# Patient Record
Sex: FEMALE | Race: WHITE | Employment: FULL TIME | URBAN - METROPOLITAN AREA
[De-identification: names, ages, dates, MRNs, and addresses within clinical notes are randomized per-mention and may not be internally consistent; named-entity substitution may affect disease eponyms.]

---

## 2023-09-14 SDOH — HEALTH STABILITY: PHYSICAL HEALTH: ON AVERAGE, HOW MANY DAYS PER WEEK DO YOU ENGAGE IN MODERATE TO STRENUOUS EXERCISE (LIKE A BRISK WALK)?: 6 DAYS

## 2023-09-14 SDOH — HEALTH STABILITY: PHYSICAL HEALTH: ON AVERAGE, HOW MANY MINUTES DO YOU ENGAGE IN EXERCISE AT THIS LEVEL?: 40 MIN

## 2023-09-15 ENCOUNTER — OFFICE VISIT (OUTPATIENT)
Dept: PRIMARY CARE CLINIC | Age: 38
End: 2023-09-15
Payer: COMMERCIAL

## 2023-09-15 VITALS
WEIGHT: 127 LBS | SYSTOLIC BLOOD PRESSURE: 112 MMHG | BODY MASS INDEX: 21.68 KG/M2 | HEIGHT: 64 IN | DIASTOLIC BLOOD PRESSURE: 70 MMHG

## 2023-09-15 DIAGNOSIS — J45.909 MODERATE ASTHMA WITHOUT COMPLICATION, UNSPECIFIED WHETHER PERSISTENT: ICD-10-CM

## 2023-09-15 DIAGNOSIS — F90.9 ATTENTION DEFICIT HYPERACTIVITY DISORDER (ADHD), UNSPECIFIED ADHD TYPE: ICD-10-CM

## 2023-09-15 DIAGNOSIS — K21.9 GASTROESOPHAGEAL REFLUX DISEASE WITHOUT ESOPHAGITIS: ICD-10-CM

## 2023-09-15 DIAGNOSIS — G89.29 CHRONIC BILATERAL LOW BACK PAIN WITH RIGHT-SIDED SCIATICA: ICD-10-CM

## 2023-09-15 DIAGNOSIS — R10.84 GENERALIZED ABDOMINAL PAIN: ICD-10-CM

## 2023-09-15 DIAGNOSIS — Z80.8 FAMILY HISTORY OF MELANOMA: ICD-10-CM

## 2023-09-15 DIAGNOSIS — Z00.00 ROUTINE ADULT HEALTH MAINTENANCE: Primary | ICD-10-CM

## 2023-09-15 DIAGNOSIS — Z87.19 HX OF UMBILICAL HERNIA REPAIR: ICD-10-CM

## 2023-09-15 DIAGNOSIS — K58.9 IRRITABLE BOWEL SYNDROME, UNSPECIFIED TYPE: ICD-10-CM

## 2023-09-15 DIAGNOSIS — N93.9 ABNORMAL VAGINAL BLEEDING: ICD-10-CM

## 2023-09-15 DIAGNOSIS — I47.1 SVT (SUPRAVENTRICULAR TACHYCARDIA) (HCC): ICD-10-CM

## 2023-09-15 DIAGNOSIS — M54.41 CHRONIC BILATERAL LOW BACK PAIN WITH RIGHT-SIDED SCIATICA: ICD-10-CM

## 2023-09-15 DIAGNOSIS — Z98.891 HISTORY OF CESAREAN SECTION: ICD-10-CM

## 2023-09-15 DIAGNOSIS — Z86.19 HISTORY OF SHINGLES: ICD-10-CM

## 2023-09-15 DIAGNOSIS — Z98.890 HX OF UMBILICAL HERNIA REPAIR: ICD-10-CM

## 2023-09-15 PROCEDURE — 99385 PREV VISIT NEW AGE 18-39: CPT | Performed by: STUDENT IN AN ORGANIZED HEALTH CARE EDUCATION/TRAINING PROGRAM

## 2023-09-15 RX ORDER — FLUTICASONE PROPIONATE AND SALMETEROL 500; 50 UG/1; UG/1
1 POWDER RESPIRATORY (INHALATION) 2 TIMES DAILY
Qty: 60 EACH | Refills: 5 | Status: SHIPPED | OUTPATIENT
Start: 2023-09-15

## 2023-09-15 RX ORDER — MELOXICAM 15 MG/1
15 TABLET ORAL DAILY
COMMUNITY
Start: 2023-07-15 | End: 2023-09-15 | Stop reason: SDUPTHER

## 2023-09-15 RX ORDER — ALBUTEROL SULFATE 90 UG/1
2 AEROSOL, METERED RESPIRATORY (INHALATION) EVERY 4 HOURS PRN
Qty: 18 G | Refills: 5 | Status: SHIPPED | OUTPATIENT
Start: 2023-09-15

## 2023-09-15 RX ORDER — PANTOPRAZOLE SODIUM 40 MG/1
40 TABLET, DELAYED RELEASE ORAL DAILY
COMMUNITY
Start: 2022-08-19

## 2023-09-15 RX ORDER — ALBUTEROL SULFATE 90 UG/1
2 AEROSOL, METERED RESPIRATORY (INHALATION) EVERY 4 HOURS PRN
COMMUNITY
Start: 2022-06-29 | End: 2023-09-15 | Stop reason: SDUPTHER

## 2023-09-15 RX ORDER — MELOXICAM 15 MG/1
15 TABLET ORAL DAILY
Qty: 30 TABLET | Refills: 0 | Status: SHIPPED | OUTPATIENT
Start: 2023-09-15

## 2023-09-15 RX ORDER — PREDNISONE 20 MG/1
40 TABLET ORAL DAILY
COMMUNITY
Start: 2022-12-22 | End: 2023-09-15

## 2023-09-15 RX ORDER — FLUTICASONE PROPIONATE AND SALMETEROL 500; 50 UG/1; UG/1
1 POWDER RESPIRATORY (INHALATION) 2 TIMES DAILY
COMMUNITY
Start: 2022-12-17 | End: 2023-09-15 | Stop reason: SDUPTHER

## 2023-09-15 RX ORDER — DEXMETHYLPHENIDATE HYDROCHLORIDE 5 MG/1
5 TABLET ORAL 2 TIMES DAILY
COMMUNITY
Start: 2023-05-10

## 2023-09-15 RX ORDER — BUDESONIDE 0.5 MG/2ML
0.5 INHALANT ORAL DAILY
COMMUNITY
Start: 2023-02-11

## 2023-09-15 RX ORDER — ALBUTEROL SULFATE 2.5 MG/3ML
2.5 SOLUTION RESPIRATORY (INHALATION) EVERY 6 HOURS PRN
COMMUNITY
Start: 2022-12-17

## 2023-09-15 SDOH — ECONOMIC STABILITY: INCOME INSECURITY: HOW HARD IS IT FOR YOU TO PAY FOR THE VERY BASICS LIKE FOOD, HOUSING, MEDICAL CARE, AND HEATING?: NOT HARD AT ALL

## 2023-09-15 SDOH — ECONOMIC STABILITY: FOOD INSECURITY: WITHIN THE PAST 12 MONTHS, YOU WORRIED THAT YOUR FOOD WOULD RUN OUT BEFORE YOU GOT MONEY TO BUY MORE.: NEVER TRUE

## 2023-09-15 SDOH — ECONOMIC STABILITY: HOUSING INSECURITY
IN THE LAST 12 MONTHS, WAS THERE A TIME WHEN YOU DID NOT HAVE A STEADY PLACE TO SLEEP OR SLEPT IN A SHELTER (INCLUDING NOW)?: NO

## 2023-09-15 SDOH — ECONOMIC STABILITY: FOOD INSECURITY: WITHIN THE PAST 12 MONTHS, THE FOOD YOU BOUGHT JUST DIDN'T LAST AND YOU DIDN'T HAVE MONEY TO GET MORE.: NEVER TRUE

## 2023-09-15 ASSESSMENT — PATIENT HEALTH QUESTIONNAIRE - PHQ9
2. FEELING DOWN, DEPRESSED OR HOPELESS: 0
1. LITTLE INTEREST OR PLEASURE IN DOING THINGS: 0
SUM OF ALL RESPONSES TO PHQ9 QUESTIONS 1 & 2: 0
SUM OF ALL RESPONSES TO PHQ QUESTIONS 1-9: 0

## 2023-09-15 ASSESSMENT — ENCOUNTER SYMPTOMS
BLOOD IN STOOL: 0
ABDOMINAL PAIN: 1
BACK PAIN: 1

## 2023-09-15 NOTE — ASSESSMENT & PLAN NOTE
Stable  - has been evaluated for this in the past by [facility] physicians - MRI order placed but patient never obtained and referred to PT  - reports she fell on a snow board when she was younger, has been having issues since then   Currently: has sciatica down right leg, continues to be able to walk with no major issues , no issues with defecating or urination   Plan: MRI placed

## 2023-09-15 NOTE — ASSESSMENT & PLAN NOTE
Stable  - diagnosed per laryngoscopy (ENT) due to having with trouble speaking   - had been seeing a speech pathologist in past   - currently stable   - medical management with protonix- continue

## 2023-09-15 NOTE — ASSESSMENT & PLAN NOTE
Stable- controlled with albuterol prn and advair BID - refilled today   - hx of exacerbation 2/2 to covid

## 2023-09-15 NOTE — ASSESSMENT & PLAN NOTE
- reporting heavy vaginal bleeding with clotting which is unusual for her   - hx of 2 c sections in 2016 and 2018   - is also due for a pap   Plan:   - referral placed to obgyn

## 2023-09-15 NOTE — PROGRESS NOTES
[id number] [facility] PRIMARY AND WALK-IN CARE  [facility]  [address] [place] [address] [place]  Dept: [phone]  Dept Fax: [phone]  Loc: [phone]     9/15/2023    Visit type: New patient    Reason for Visit: New Patient and H&P (Pt would like to discuss 2nd round of shingles, lower back pain, c/o RLQ pain and changes in cycles)       ASSESSMENT/PLAN   1. Routine adult health maintenance  -     CBC with Auto Differential; Future  -     Comprehensive Metabolic Panel; Future  -     Lipid, Fasting; Future  -     Hemoglobin A1C; Future  -     HIV Screen; Future  -     Hepatitis C Antibody; Future  -     Alonso Vela MD, OB-GYN, [place]  2. Chronic bilateral low back pain with right-sided sciatica  Assessment & Plan:   Stable  - has been evaluated for this in the past by [facility] physicians - MRI order placed but patient never obtained and referred to PT  - reports she fell on a snow board when she was younger, has been having issues since then   Currently: has sciatica down right leg, continues to be able to walk with no major issues , no issues with defecating or urination   Plan: MRI placed   Orders:  -     meloxicam (MOBIC) 15 MG tablet; Take 1 tablet by mouth daily, Disp-30 tablet, R-0Normal  -     MRI LUMBAR SPINE W WO CONTRAST; Future  3. Moderate asthma without complication, unspecified whether persistent  Assessment & Plan:   Stable- controlled with albuterol prn and advair BID - refilled today   - hx of exacerbation 2/2 to covid   - seen by pulmonology in the past at [facility]   Orders:  -     fluticasone-salmeterol (ADVAIR DISKUS) 500-50 MCG/ACT AEPB diskus inhaler; Inhale 1 puff into the lungs 2 times daily, Disp-60 each, R-5Normal  -     albuterol sulfate HFA (PROVENTIL;VENTOLIN;PROAIR) 108 (90 Base) MCG/ACT inhaler; Inhale 2 puffs into the lungs every 4 hours as needed for Shortness of Breath or Wheezing, Disp-18 g, R-5Normal  4.  Abnormal vaginal

## 2023-09-15 NOTE — ASSESSMENT & PLAN NOTE
Stable  - no rebound on exam   - hx of 2 c sections, hx of IBS, hx of umbilical hernia repair in December <1year ago   - pain located in right middle quadrant and only present with deep palpitation   - not associated with position or food or bowel movements   - has a gallbladder  - etiology unclear at this time, no concern for acute problems, will follow peripherally and if worsening consider CT

## 2023-09-15 NOTE — ASSESSMENT & PLAN NOTE
- seen by [facility] derm in 2022  - diagnosed with lentigines, multiple benign nevi and cherry angioma   Plan:   - was supposed to follow up with them but had insurance changes  - referral placed to derm

## 2023-09-15 NOTE — ASSESSMENT & PLAN NOTE
Resolved   - hx of shingles in college and in 2021 s/p valtrex   - will be due to shingles vaccine at age 49

## 2023-09-15 NOTE — ASSESSMENT & PLAN NOTE
Stable  - used to be on focalin, currently not taking   - at this time patient does not want to restart but would like to discuss other options in the future

## 2023-10-03 DIAGNOSIS — Z00.00 ROUTINE ADULT HEALTH MAINTENANCE: ICD-10-CM

## 2023-10-03 LAB
ALBUMIN SERPL-MCNC: 4.4 G/DL (ref 3.5–4.6)
ALP SERPL-CCNC: 37 U/L (ref 40–130)
ALT SERPL-CCNC: 8 U/L (ref 0–33)
ANION GAP SERPL CALCULATED.3IONS-SCNC: 12 MEQ/L (ref 9–15)
AST SERPL-CCNC: 23 U/L (ref 0–35)
BASOPHILS # BLD: 0 K/UL (ref 0–0.2)
BASOPHILS NFR BLD: 0.9 %
BILIRUB SERPL-MCNC: 0.3 MG/DL (ref 0.2–0.7)
BUN SERPL-MCNC: 10 MG/DL (ref 6–20)
CALCIUM SERPL-MCNC: 9 MG/DL (ref 8.5–9.9)
CHLORIDE SERPL-SCNC: 104 MEQ/L (ref 95–107)
CHOLEST SERPL-MCNC: 151 MG/DL (ref 0–199)
CO2 SERPL-SCNC: 25 MEQ/L (ref 20–31)
CREAT SERPL-MCNC: 0.71 MG/DL (ref 0.5–0.9)
EOSINOPHIL # BLD: 0.1 K/UL (ref 0–0.7)
EOSINOPHIL NFR BLD: 1.2 %
ERYTHROCYTE [DISTWIDTH] IN BLOOD BY AUTOMATED COUNT: 12.1 % (ref 11.5–14.5)
GLOBULIN SER CALC-MCNC: 2.9 G/DL (ref 2.3–3.5)
GLUCOSE SERPL-MCNC: 94 MG/DL (ref 70–99)
HBA1C MFR BLD: 4.8 % (ref 4.8–5.9)
HCT VFR BLD AUTO: 38.5 % (ref 37–47)
HDLC SERPL-MCNC: 72 MG/DL (ref 40–59)
HGB BLD-MCNC: 13 G/DL (ref 12–16)
LDL CHOLESTEROL CALCULATED: 65 MG/DL (ref 0–129)
LYMPHOCYTES # BLD: 2 K/UL (ref 1–4.8)
LYMPHOCYTES NFR BLD: 46.1 %
MCH RBC QN AUTO: 32.6 PG (ref 27–31.3)
MCHC RBC AUTO-ENTMCNC: 33.8 % (ref 33–37)
MCV RBC AUTO: 96.5 FL (ref 79.4–94.8)
MONOCYTES # BLD: 0.3 K/UL (ref 0.2–0.8)
MONOCYTES NFR BLD: 6.5 %
NEUTROPHILS # BLD: 2 K/UL (ref 1.4–6.5)
NEUTS SEG NFR BLD: 45.1 %
PLATELET # BLD AUTO: 244 K/UL (ref 130–400)
POTASSIUM SERPL-SCNC: 4.9 MEQ/L (ref 3.4–4.9)
PROT SERPL-MCNC: 7.3 G/DL (ref 6.3–8)
RBC # BLD AUTO: 3.99 M/UL (ref 4.2–5.4)
SODIUM SERPL-SCNC: 141 MEQ/L (ref 135–144)
TRIGLYCERIDE, FASTING: 71 MG/DL (ref 0–150)
WBC # BLD AUTO: 4.3 K/UL (ref 4.8–10.8)

## 2023-10-04 LAB
HEPATITIS C ANTIBODY: NONREACTIVE
HIV AG/AB: NONREACTIVE

## 2023-10-06 ENCOUNTER — TELEPHONE (OUTPATIENT)
Dept: OBSTETRICS AND GYNECOLOGY | Facility: CLINIC | Age: 38
End: 2023-10-06

## 2023-10-08 PROBLEM — N94.3 PMS (PREMENSTRUAL SYNDROME): Status: ACTIVE | Noted: 2023-10-08

## 2023-10-08 RX ORDER — FLUTICASONE PROPIONATE AND SALMETEROL 500; 50 UG/1; UG/1
1 POWDER RESPIRATORY (INHALATION) 2 TIMES DAILY
COMMUNITY
Start: 2023-09-15

## 2023-10-08 RX ORDER — MELOXICAM 15 MG/1
15 TABLET ORAL DAILY
COMMUNITY
Start: 2023-09-15

## 2023-10-08 RX ORDER — HYDROXYZINE HYDROCHLORIDE 25 MG/1
25 TABLET, FILM COATED ORAL DAILY PRN
COMMUNITY
Start: 2022-12-20 | End: 2023-11-14 | Stop reason: ALTCHOICE

## 2023-10-08 RX ORDER — ALBUTEROL SULFATE 90 UG/1
2 AEROSOL, METERED RESPIRATORY (INHALATION) EVERY 4 HOURS PRN
COMMUNITY
Start: 2023-09-15

## 2023-10-08 RX ORDER — PANTOPRAZOLE SODIUM 40 MG/1
40 TABLET, DELAYED RELEASE ORAL DAILY
COMMUNITY
Start: 2022-11-20

## 2023-10-08 RX ORDER — DROSPIRENONE AND ETHINYL ESTRADIOL 0.03MG-3MG
1 KIT ORAL DAILY
COMMUNITY
Start: 2023-09-20

## 2023-10-08 RX ORDER — DEXMETHYLPHENIDATE HYDROCHLORIDE 5 MG/1
5 TABLET ORAL 2 TIMES DAILY
COMMUNITY
Start: 2023-05-10 | End: 2024-05-16 | Stop reason: SDUPTHER

## 2023-10-08 RX ORDER — BUDESONIDE 0.5 MG/2ML
0.5 INHALANT ORAL
COMMUNITY
Start: 2022-12-17

## 2023-10-10 ENCOUNTER — TELEMEDICINE (OUTPATIENT)
Dept: GASTROENTEROLOGY | Facility: CLINIC | Age: 38
End: 2023-10-10
Payer: COMMERCIAL

## 2023-10-10 ENCOUNTER — LAB (OUTPATIENT)
Dept: LAB | Facility: LAB | Age: 38
End: 2023-10-10
Payer: COMMERCIAL

## 2023-10-10 DIAGNOSIS — R14.0 BLOATING: ICD-10-CM

## 2023-10-10 DIAGNOSIS — R10.12 LEFT UPPER QUADRANT ABDOMINAL PAIN: ICD-10-CM

## 2023-10-10 DIAGNOSIS — R14.0 BLOATING: Primary | ICD-10-CM

## 2023-10-10 PROBLEM — K58.9 IBS (IRRITABLE BOWEL SYNDROME): Status: ACTIVE | Noted: 2023-10-10

## 2023-10-10 PROCEDURE — 82607 VITAMIN B-12: CPT

## 2023-10-10 PROCEDURE — 86258 DGP ANTIBODY EACH IG CLASS: CPT

## 2023-10-10 PROCEDURE — 83550 IRON BINDING TEST: CPT

## 2023-10-10 PROCEDURE — 99203 OFFICE O/P NEW LOW 30 MIN: CPT | Performed by: NURSE PRACTITIONER

## 2023-10-10 PROCEDURE — 86364 TISS TRNSGLTMNASE EA IG CLAS: CPT

## 2023-10-10 PROCEDURE — 83540 ASSAY OF IRON: CPT

## 2023-10-10 PROCEDURE — 82746 ASSAY OF FOLIC ACID SERUM: CPT

## 2023-10-10 PROCEDURE — 82728 ASSAY OF FERRITIN: CPT

## 2023-10-10 PROCEDURE — 99213 OFFICE O/P EST LOW 20 MIN: CPT | Mod: 95 | Performed by: NURSE PRACTITIONER

## 2023-10-10 PROCEDURE — 82565 ASSAY OF CREATININE: CPT

## 2023-10-10 PROCEDURE — 36415 COLL VENOUS BLD VENIPUNCTURE: CPT

## 2023-10-10 PROCEDURE — 84520 ASSAY OF UREA NITROGEN: CPT

## 2023-10-10 RX ORDER — PEPPERMINT OIL 90 MG
CAPSULE, DELAYED, AND EXTENDED RELEASE ORAL
Qty: 30 CAPSULE | Refills: 3 | Status: SHIPPED | OUTPATIENT
Start: 2023-10-10 | End: 2023-11-14 | Stop reason: ALTCHOICE

## 2023-10-10 RX ORDER — WHEAT DEXTRIN 3 G/4 G
POWDER (GRAM) ORAL
Qty: 248 G | Refills: 1 | Status: SHIPPED | OUTPATIENT
Start: 2023-10-10 | End: 2023-11-14 | Stop reason: ALTCHOICE

## 2023-10-10 ASSESSMENT — ENCOUNTER SYMPTOMS
CARDIOVASCULAR NEGATIVE: 1
MUSCULOSKELETAL NEGATIVE: 1
RESPIRATORY NEGATIVE: 1
EYES NEGATIVE: 1
ENDOCRINE NEGATIVE: 1
CONSTITUTIONAL NEGATIVE: 1

## 2023-10-10 NOTE — PROGRESS NOTES
Subjective   Patient ID: Sonia Zaidi is a 38 y.o. female who presents for abdominal bloating.  She reports a long GI history. Has not been to GI in many years. Has a h/o constipation, bloating, gas. She has had abdominal xrays and colon cleanse.   She reports more recently she has a change. She is now having a constant bloat. She still has constipation. She is now having stomach cramping and over the last 2 months she is having abdominal pain, right side and right hip and left side. Described as burning. She had lab work.   She is not vegan.   The pain is not worse/better with a bowel movement but does improve somewhat  She is having a BM daily or 3-4 days and the pain is worse during those times. She is not using medications for constipation. She is not using anything daily.   No rectal bleeding, vomiting, +gas, nocturnal BM, dark tarry stools.   She denies weight loss or night sweats.   She had a colonoscopy in her 20s  Has fatty stools, rare--+SIBO in the past     Family Hx: P. Grandmother and grandfather with CRC (@ 74, 70s)  Father had glioblastoma (no genetic testing)   @ 72  No consanguinity  PSH: H/o umbilical hernia repair last year     Review of Systems   Constitutional: Negative.    HENT: Negative.     Eyes: Negative.    Respiratory: Negative.     Cardiovascular: Negative.    Endocrine: Negative.    Genitourinary: Negative.    Musculoskeletal: Negative.        Objective   Physical Exam  Neurological:      Mental Status: She is alert.         Assessment/Plan     Diagnoses and all orders for this visit:  Bloating  -     Ferritin; Future  -     wheat dextrin (Benefiber Sugar Free, dextrin,) 3 gram/4 gram powder; Take 1 teaspoon daily with a full glass of water (8 ounces)  -     Vitamin B12; Future  -     Folate; Future  -     Celiac Panel; Future  -     CT abdomen pelvis w IV contrast; Future  -     peppermint oiL (IBgard) 90 mg capsule,delayed,extend.release; Take as directed on box  -     Breath Hydrogen  Test-Dextrose; Future  Left upper quadrant abdominal pain  -     Ferritin; Future  -     wheat dextrin (Benefiber Sugar Free, dextrin,) 3 gram/4 gram powder; Take 1 teaspoon daily with a full glass of water (8 ounces)  -     Vitamin B12; Future  -     Folate; Future  -     Celiac Panel; Future  -     CT abdomen pelvis w IV contrast; Future  -     peppermint oiL (IBgard) 90 mg capsule,delayed,extend.release; Take as directed on box  -     Breath Hydrogen Test-Dextrose; Future

## 2023-10-11 LAB
BUN SERPL-MCNC: 12 MG/DL (ref 6–23)
CREAT SERPL-MCNC: 0.92 MG/DL (ref 0.5–1.05)
FERRITIN SERPL-MCNC: 17 NG/ML (ref 8–150)
FOLATE SERPL-MCNC: 14.3 NG/ML
GFR SERPL CREATININE-BSD FRML MDRD: 82 ML/MIN/1.73M*2
GLIADIN PEPTIDE IGA SER IA-ACNC: <1 U/ML
GLIADIN PEPTIDE IGG SER IA-ACNC: <1 U/ML
IRON SATN MFR SERPL: 20 %
IRON SERPL-MCNC: 83 UG/DL (ref 35–150)
TIBC SERPL-MCNC: 424 UG/DL
TTG IGA SER IA-ACNC: <1 U/ML
TTG IGG SER IA-ACNC: <1 U/ML
UIBC SERPL-MCNC: 341 UG/DL (ref 110–370)
VIT B12 SERPL-MCNC: 469 PG/ML (ref 211–911)

## 2023-10-12 ENCOUNTER — APPOINTMENT (OUTPATIENT)
Dept: GASTROENTEROLOGY | Facility: CLINIC | Age: 38
End: 2023-10-12
Payer: COMMERCIAL

## 2023-10-26 ENCOUNTER — ANCILLARY PROCEDURE (OUTPATIENT)
Dept: RADIOLOGY | Facility: CLINIC | Age: 38
End: 2023-10-26
Payer: COMMERCIAL

## 2023-10-26 DIAGNOSIS — R10.12 LEFT UPPER QUADRANT ABDOMINAL PAIN: ICD-10-CM

## 2023-10-26 DIAGNOSIS — R14.0 BLOATING: ICD-10-CM

## 2023-10-26 PROCEDURE — 74177 CT ABD & PELVIS W/CONTRAST: CPT

## 2023-10-26 PROCEDURE — 2550000001 HC RX 255 CONTRASTS: Performed by: NURSE PRACTITIONER

## 2023-10-26 PROCEDURE — 74177 CT ABD & PELVIS W/CONTRAST: CPT | Performed by: RADIOLOGY

## 2023-10-26 RX ADMIN — IOHEXOL 100 ML: 350 INJECTION, SOLUTION INTRAVENOUS at 09:33

## 2023-10-27 ENCOUNTER — TELEPHONE (OUTPATIENT)
Dept: GASTROENTEROLOGY | Facility: CLINIC | Age: 38
End: 2023-10-27
Payer: COMMERCIAL

## 2023-10-27 DIAGNOSIS — R93.5 ABNORMAL CT OF THE ABDOMEN: Primary | ICD-10-CM

## 2023-10-28 ENCOUNTER — LAB (OUTPATIENT)
Dept: LAB | Facility: LAB | Age: 38
End: 2023-10-28
Payer: COMMERCIAL

## 2023-10-28 DIAGNOSIS — R93.5 ABNORMAL CT OF THE ABDOMEN: ICD-10-CM

## 2023-10-28 PROCEDURE — 87328 CRYPTOSPORIDIUM AG IA: CPT

## 2023-10-28 PROCEDURE — 87493 C DIFF AMPLIFIED PROBE: CPT

## 2023-10-28 PROCEDURE — 87506 IADNA-DNA/RNA PROBE TQ 6-11: CPT

## 2023-10-28 PROCEDURE — 83993 ASSAY FOR CALPROTECTIN FECAL: CPT

## 2023-10-28 PROCEDURE — 87329 GIARDIA AG IA: CPT

## 2023-10-29 LAB
C COLI+JEJ+UPSA DNA STL QL NAA+PROBE: NOT DETECTED
C DIF TOX TCDA+TCDB STL QL NAA+PROBE: NOT DETECTED
EC STX1 GENE STL QL NAA+PROBE: NOT DETECTED
EC STX2 GENE STL QL NAA+PROBE: NOT DETECTED
NOROVIRUS GI + GII RNA STL NAA+PROBE: NOT DETECTED
RV RNA STL NAA+PROBE: NOT DETECTED
SALMONELLA DNA STL QL NAA+PROBE: NOT DETECTED
SHIGELLA DNA SPEC QL NAA+PROBE: NOT DETECTED
V CHOLERAE DNA STL QL NAA+PROBE: NOT DETECTED
Y ENTEROCOL DNA STL QL NAA+PROBE: NOT DETECTED

## 2023-10-30 DIAGNOSIS — K42.9 UMBILICAL HERNIA WITHOUT OBSTRUCTION AND WITHOUT GANGRENE: Primary | ICD-10-CM

## 2023-10-31 LAB — CALPROTECTIN STL-MCNT: 6 UG/G

## 2023-10-31 NOTE — TELEPHONE ENCOUNTER
Spoke to patient regarding CT results  Hernia is not palpable d/t diastasis recti   Soft and reducible  NO pain, did have umbilical hernia repair    She will follow up with GS and GYN

## 2023-11-01 LAB
CRYPTOSP AG STL QL IA: NEGATIVE
G LAMBLIA AG STL QL IA: NEGATIVE

## 2023-11-03 DIAGNOSIS — N93.9 ABNORMAL UTERINE BLEEDING (AUB): ICD-10-CM

## 2023-11-06 LAB — O+P STL MICRO: NEGATIVE

## 2023-11-09 ENCOUNTER — ANCILLARY PROCEDURE (OUTPATIENT)
Dept: RADIOLOGY | Facility: CLINIC | Age: 38
End: 2023-11-09
Payer: COMMERCIAL

## 2023-11-09 ENCOUNTER — APPOINTMENT (OUTPATIENT)
Dept: SURGERY | Facility: CLINIC | Age: 38
End: 2023-11-09
Payer: COMMERCIAL

## 2023-11-09 DIAGNOSIS — N93.9 ABNORMAL UTERINE BLEEDING (AUB): ICD-10-CM

## 2023-11-09 PROCEDURE — 76830 TRANSVAGINAL US NON-OB: CPT | Performed by: RADIOLOGY

## 2023-11-09 PROCEDURE — 76830 TRANSVAGINAL US NON-OB: CPT

## 2023-11-09 PROCEDURE — 76856 US EXAM PELVIC COMPLETE: CPT | Performed by: RADIOLOGY

## 2023-11-10 DIAGNOSIS — Z12.11 SCREENING FOR COLON CANCER: Primary | ICD-10-CM

## 2023-11-10 RX ORDER — POLYETHYLENE GLYCOL 3350, SODIUM SULFATE ANHYDROUS, SODIUM BICARBONATE, SODIUM CHLORIDE, POTASSIUM CHLORIDE 236; 22.74; 6.74; 5.86; 2.97 G/4L; G/4L; G/4L; G/4L; G/4L
4000 POWDER, FOR SOLUTION ORAL ONCE
Qty: 4000 ML | Refills: 0 | Status: SHIPPED | OUTPATIENT
Start: 2023-11-10 | End: 2023-11-10

## 2023-11-14 ENCOUNTER — OFFICE VISIT (OUTPATIENT)
Dept: SURGERY | Facility: CLINIC | Age: 38
End: 2023-11-14
Payer: COMMERCIAL

## 2023-11-14 ENCOUNTER — DOCUMENTATION (OUTPATIENT)
Dept: GASTROENTEROLOGY | Facility: CLINIC | Age: 38
End: 2023-11-14

## 2023-11-14 ENCOUNTER — PATIENT MESSAGE (OUTPATIENT)
Dept: GASTROENTEROLOGY | Facility: CLINIC | Age: 38
End: 2023-11-14

## 2023-11-14 VITALS — HEART RATE: 83 BPM | DIASTOLIC BLOOD PRESSURE: 85 MMHG | SYSTOLIC BLOOD PRESSURE: 125 MMHG | WEIGHT: 127 LBS

## 2023-11-14 DIAGNOSIS — R10.11 RIGHT UPPER QUADRANT ABDOMINAL PAIN: Primary | ICD-10-CM

## 2023-11-14 DIAGNOSIS — K63.8219 SMALL INTESTINAL BACTERIAL OVERGROWTH (SIBO): Primary | ICD-10-CM

## 2023-11-14 PROCEDURE — 99203 OFFICE O/P NEW LOW 30 MIN: CPT | Performed by: SURGERY

## 2023-11-14 NOTE — PROGRESS NOTES
History Of Present Illness  Sonia Zaidi is a 38 y.o. female presenting abdominal pain.  She has had intermittent abdominal pain interval symptoms for several years.  She is having a very thorough good work-up by her GI colleagues.  She had an umbilical hernia.  1 year ago at the [facility].  She does have some diastases rectus after 2 children.  She did have C-sections with his 2 children.  She has no family history of gallstones.  She does have some intermittent right upper quadrant pain.  Also right lower quadrant pain.  She is seeing me to assess on whether or not she truly has a hernia.  The CT scan made mention of that.  CT scan did make mention of a distended gallbladder.        Last Recorded Vitals  Blood pressure 125/85, pulse 83, weight 57.6 kg (127 lb).  Physical Examination  Family who are both standing up and lying down.  She has a curvilinear lateral umbilical incision.  She has some minor diastases rectus.  No umbilical hernia.      Relevant Results reviewed the CT scan with her.  Showing her the difference.  Diastases what would be a hernia.      Assessment/Plan no evidence of recurrent hernia.  I gave her my diastases rectus paperwork for exercises for diastases.  She has no limitations to her activities.  She can undergo her colonoscopy.  I ordered an ultrasound just to rule out gallstones.  I did show her the gallstone booklet.  This just to be complete I do not think she truly has gallstones but we will at least get the ultrasound to rule this out.  She will follow-up me as needed.    Ryan Huizar MD FACS  Professor of Surgery  Chanell Rothman Chair in Surgical [place]  [employer] [facility] [employer]  [address] [place] [address], [id number]  Phone [phone]  email: [email or web address]

## 2023-11-16 ENCOUNTER — APPOINTMENT (OUTPATIENT)
Dept: RADIOLOGY | Facility: CLINIC | Age: 38
End: 2023-11-16
Payer: COMMERCIAL

## 2023-11-16 ENCOUNTER — ANCILLARY PROCEDURE (OUTPATIENT)
Dept: RADIOLOGY | Facility: CLINIC | Age: 38
End: 2023-11-16
Payer: COMMERCIAL

## 2023-11-16 DIAGNOSIS — R10.11 RIGHT UPPER QUADRANT ABDOMINAL PAIN: ICD-10-CM

## 2023-11-16 PROCEDURE — 76705 ECHO EXAM OF ABDOMEN: CPT

## 2023-11-16 PROCEDURE — 76705 ECHO EXAM OF ABDOMEN: CPT | Performed by: STUDENT IN AN ORGANIZED HEALTH CARE EDUCATION/TRAINING PROGRAM

## 2023-11-20 ENCOUNTER — CLINICAL SUPPORT (OUTPATIENT)
Dept: GASTROENTEROLOGY | Facility: CLINIC | Age: 38
End: 2023-11-20
Payer: COMMERCIAL

## 2023-11-20 ENCOUNTER — TELEPHONE (OUTPATIENT)
Dept: SURGERY | Facility: HOSPITAL | Age: 38
End: 2023-11-20
Payer: COMMERCIAL

## 2023-11-20 VITALS
RESPIRATION RATE: 16 BRPM | DIASTOLIC BLOOD PRESSURE: 85 MMHG | HEART RATE: 80 BPM | BODY MASS INDEX: 22.36 KG/M2 | WEIGHT: 131 LBS | SYSTOLIC BLOOD PRESSURE: 124 MMHG | HEIGHT: 64 IN

## 2023-11-20 DIAGNOSIS — R14.0 BLOATING: ICD-10-CM

## 2023-11-20 DIAGNOSIS — K63.8219 SMALL INTESTINAL BACTERIAL OVERGROWTH (SIBO): Primary | ICD-10-CM

## 2023-11-20 PROCEDURE — 91065 BREATH HYDROGEN/METHANE TEST: CPT | Performed by: NURSE PRACTITIONER

## 2023-11-20 RX ORDER — NEOMYCIN SULFATE 500 MG/1
500 TABLET ORAL 2 TIMES DAILY
Qty: 28 TABLET | Refills: 0 | Status: SHIPPED | OUTPATIENT
Start: 2023-11-20 | End: 2023-11-27 | Stop reason: SDUPTHER

## 2023-11-20 RX ORDER — WHEAT DEXTRIN 3 G/4 G
POWDER (GRAM) ORAL
Qty: 248 G | Refills: 1 | Status: SHIPPED | OUTPATIENT
Start: 2023-11-20

## 2023-11-20 RX ORDER — POLYETHYLENE GLYCOL 3350 17 G/17G
17 POWDER, FOR SOLUTION ORAL DAILY PRN
Qty: 1 PACKET | Refills: 0 | Status: SHIPPED | OUTPATIENT
Start: 2023-11-20 | End: 2024-02-19 | Stop reason: WASHOUT

## 2023-11-20 NOTE — PROGRESS NOTES
Patient ID: Sonia Zaidi is a 38 y.o. female.    Breath Hydrogen Test-Dextrose    Date/Time: 11/20/2023 9:35 AM    Performed by: Eliza Zepeda  Authorized by: MICHAEL Maurer    Consent:     Consent obtained:  Verbal  Universal protocol:     Procedure explained and questions answered to patient or proxy's satisfaction: yes      Relevant documents present and verified: yes      Test results available: yes      Imaging studies available: no      Required blood products, implants, devices, and special equipment available: no      Site/side marked: yes      Immediately prior to procedure, a time out was called: yes      Patient identity confirmed:  Verbally with patient  Post-procedure details:     Procedure completion:  Tolerated well, no immediate complications  Hydrogen Breath Analysis Consultation Sheet    Referring Provider: No referring provider defined for this encounter.    Indication: Rule out SIBO    Symptoms: Bloating    Age: 38 y.o.  Weight:   Vitals:    11/20/23 0932   Weight: 59.4 kg (131 lb)     Substrate: Dextrose   Dose: 75 Grams    Last Meal: eggs, rice, chicken  Recent Antibiotics: Patient denies    RESULTS:   Time PPM (H2) APPM* (CH4) CO2 Correction   Baseline #1 0830 7 31 3.9 1.14   Baseline #2 0845 5 28 4.1 1.34   *Challenge Dose Sugar: 0845  15' 0900 4 24 4.1 1.34   30' 0915 3 24 4.3 1.27   45' 0930 4 27 3.8 1.44   60' 0945 3 24 3.7 1.48   75' 1000 3 29 4.0 1.37   90' 1015 2 27 3.6 1.52   105' 1030 1 30 3.9 1.41   120' 1045 1 29 4.2 1.41   135'        150'        165'        180'          Impression: Positive for Methane

## 2023-11-20 NOTE — TELEPHONE ENCOUNTER
Discussed with her that the ultrasound showed no evidence of any gallstones.  Presently there is no surgical indications needed for the hernia or for other abdominal problems.  She will follow-up me as needed.

## 2023-11-21 ENCOUNTER — TELEPHONE (OUTPATIENT)
Dept: GASTROENTEROLOGY | Facility: CLINIC | Age: 38
End: 2023-11-21
Payer: COMMERCIAL

## 2023-11-21 NOTE — TELEPHONE ENCOUNTER
Attempted to reschedule colonoscopy. VM left for pt to return call to office at earliest convenience.

## 2023-11-22 ENCOUNTER — TELEPHONE (OUTPATIENT)
Dept: GASTROENTEROLOGY | Facility: CLINIC | Age: 38
End: 2023-11-22
Payer: COMMERCIAL

## 2023-11-22 NOTE — TELEPHONE ENCOUNTER
Attempted to speak with patient in regards to rescheduling colonoscopy. VM left for patient to return call to office at earliest convenience.

## 2023-11-27 ENCOUNTER — SPECIALTY PHARMACY (OUTPATIENT)
Dept: PHARMACY | Facility: CLINIC | Age: 38
End: 2023-11-27

## 2023-11-27 DIAGNOSIS — K63.8219 SMALL INTESTINAL BACTERIAL OVERGROWTH (SIBO): ICD-10-CM

## 2023-11-27 RX ORDER — NEOMYCIN SULFATE 500 MG/1
500 TABLET ORAL 2 TIMES DAILY
Qty: 10 TABLET | Refills: 0 | Status: SHIPPED | OUTPATIENT
Start: 2023-11-27 | End: 2023-12-02

## 2023-11-29 ENCOUNTER — APPOINTMENT (OUTPATIENT)
Dept: GASTROENTEROLOGY | Facility: HOSPITAL | Age: 38
End: 2023-11-29
Payer: COMMERCIAL

## 2023-12-06 ENCOUNTER — ANESTHESIA EVENT (OUTPATIENT)
Dept: GASTROENTEROLOGY | Facility: EXTERNAL LOCATION | Age: 38
End: 2023-12-06

## 2023-12-06 RX ORDER — METRONIDAZOLE 500 MG/1
250 TABLET ORAL 3 TIMES DAILY
Qty: 15 TABLET | Refills: 0 | Status: SHIPPED | OUTPATIENT
Start: 2023-12-06 | End: 2023-12-16

## 2023-12-14 ENCOUNTER — HOSPITAL ENCOUNTER (OUTPATIENT)
Dept: GASTROENTEROLOGY | Facility: EXTERNAL LOCATION | Age: 38
Discharge: HOME | End: 2023-12-14
Payer: COMMERCIAL

## 2023-12-14 ENCOUNTER — ANESTHESIA (OUTPATIENT)
Dept: GASTROENTEROLOGY | Facility: EXTERNAL LOCATION | Age: 38
End: 2023-12-14

## 2023-12-14 VITALS
WEIGHT: 127 LBS | SYSTOLIC BLOOD PRESSURE: 90 MMHG | OXYGEN SATURATION: 99 % | DIASTOLIC BLOOD PRESSURE: 77 MMHG | HEART RATE: 92 BPM | BODY MASS INDEX: 21.68 KG/M2 | RESPIRATION RATE: 15 BRPM | HEIGHT: 64 IN | TEMPERATURE: 97.9 F

## 2023-12-14 DIAGNOSIS — R93.5 ABNORMAL CT OF THE ABDOMEN: Primary | ICD-10-CM

## 2023-12-14 PROBLEM — J45.909 MODERATE ASTHMA WITHOUT COMPLICATION (HHS-HCC): Status: ACTIVE | Noted: 2023-12-14

## 2023-12-14 LAB — PREGNANCY TEST URINE, POC: NEGATIVE

## 2023-12-14 PROCEDURE — 45385 COLONOSCOPY W/LESION REMOVAL: CPT | Performed by: INTERNAL MEDICINE

## 2023-12-14 PROCEDURE — 45381 COLONOSCOPY SUBMUCOUS NJX: CPT | Performed by: INTERNAL MEDICINE

## 2023-12-14 PROCEDURE — 88305 TISSUE EXAM BY PATHOLOGIST: CPT | Performed by: PATHOLOGY

## 2023-12-14 PROCEDURE — 88305 TISSUE EXAM BY PATHOLOGIST: CPT

## 2023-12-14 RX ORDER — LIDOCAINE HYDROCHLORIDE 20 MG/ML
INJECTION, SOLUTION INFILTRATION; PERINEURAL AS NEEDED
Status: DISCONTINUED | OUTPATIENT
Start: 2023-12-14 | End: 2023-12-14

## 2023-12-14 RX ORDER — SODIUM CHLORIDE 9 MG/ML
20 INJECTION, SOLUTION INTRAVENOUS CONTINUOUS
Status: DISCONTINUED | OUTPATIENT
Start: 2023-12-14 | End: 2023-12-15 | Stop reason: HOSPADM

## 2023-12-14 RX ORDER — PROPOFOL 10 MG/ML
INJECTION, EMULSION INTRAVENOUS AS NEEDED
Status: DISCONTINUED | OUTPATIENT
Start: 2023-12-14 | End: 2023-12-14

## 2023-12-14 RX ADMIN — PROPOFOL 50 MG: 10 INJECTION, EMULSION INTRAVENOUS at 10:55

## 2023-12-14 RX ADMIN — PROPOFOL 50 MG: 10 INJECTION, EMULSION INTRAVENOUS at 10:51

## 2023-12-14 RX ADMIN — PROPOFOL 50 MG: 10 INJECTION, EMULSION INTRAVENOUS at 11:10

## 2023-12-14 RX ADMIN — LIDOCAINE HYDROCHLORIDE 2 ML: 20 INJECTION, SOLUTION INFILTRATION; PERINEURAL at 10:48

## 2023-12-14 RX ADMIN — PROPOFOL 50 MG: 10 INJECTION, EMULSION INTRAVENOUS at 11:03

## 2023-12-14 RX ADMIN — PROPOFOL 50 MG: 10 INJECTION, EMULSION INTRAVENOUS at 10:53

## 2023-12-14 RX ADMIN — PROPOFOL 50 MG: 10 INJECTION, EMULSION INTRAVENOUS at 10:57

## 2023-12-14 RX ADMIN — PROPOFOL 50 MG: 10 INJECTION, EMULSION INTRAVENOUS at 11:15

## 2023-12-14 RX ADMIN — PROPOFOL 50 MG: 10 INJECTION, EMULSION INTRAVENOUS at 11:22

## 2023-12-14 RX ADMIN — PROPOFOL 100 MG: 10 INJECTION, EMULSION INTRAVENOUS at 10:48

## 2023-12-14 RX ADMIN — PROPOFOL 50 MG: 10 INJECTION, EMULSION INTRAVENOUS at 11:06

## 2023-12-14 RX ADMIN — PROPOFOL 50 MG: 10 INJECTION, EMULSION INTRAVENOUS at 11:29

## 2023-12-14 SDOH — HEALTH STABILITY: MENTAL HEALTH: CURRENT SMOKER: 0

## 2023-12-14 ASSESSMENT — PAIN SCALES - GENERAL
PAINLEVEL_OUTOF10: 0 - NO PAIN
PAINLEVEL_OUTOF10: 4
PAINLEVEL_OUTOF10: 0 - NO PAIN
PAIN_LEVEL: 0
PAINLEVEL_OUTOF10: 0 - NO PAIN

## 2023-12-14 ASSESSMENT — COLUMBIA-SUICIDE SEVERITY RATING SCALE - C-SSRS
2. HAVE YOU ACTUALLY HAD ANY THOUGHTS OF KILLING YOURSELF?: NO
1. IN THE PAST MONTH, HAVE YOU WISHED YOU WERE DEAD OR WISHED YOU COULD GO TO SLEEP AND NOT WAKE UP?: NO
6. HAVE YOU EVER DONE ANYTHING, STARTED TO DO ANYTHING, OR PREPARED TO DO ANYTHING TO END YOUR LIFE?: NO

## 2023-12-14 ASSESSMENT — PAIN - FUNCTIONAL ASSESSMENT
PAIN_FUNCTIONAL_ASSESSMENT: 0-10

## 2023-12-14 NOTE — DISCHARGE INSTRUCTIONS
.Patient Instructions Post Procedure      The anesthetics, sedatives or narcotics which were given to you today will be acting in your body for the next 24 hours, so you might feel a little sleepy or groggy.  This feeling should slowly wear off. Carefully read and follow the instructions.     You received sedation today:  - Do not drive or operate any machinery or power tools of any kind.   - No alcoholic beverages today, not even beer or wine.  - Do not make any important decisions or sign any legal documents.  - No over the counter medications that contain alcohol or that may cause drowsiness.    While it is common to experience mild to moderate abdominal distention, gas, or belching after your procedure, if any of these symptoms occur following discharge from the GI Lab or within one week of having your procedure, call the [facility] to be advised whether a visit to your nearest Urgent Care or Emergency Department is indicated.  Take this paper with you if you go.   - If you develop an allergic reaction to the medications that were given during your procedure such as difficulty breathing, rash, hives, severe nausea, vomiting or lightheadedness.  - If you experience chest pain, shortness of breath, severe abdominal pain, fevers and chills.  -If you develop signs and symptoms of bleeding such as blood in your spit, if your stools turn black, tarry, or bloody  - If you have not urinated within 8 hours following your procedure.  - If your IV site becomes painful, red, inflamed, or looks infected.    If you received a biopsy/polypectomy/sphincterotomy the following instructions apply below:  __ Do not use Aspirin containing products, non-steroidal medications or anti-coagulants for one week following your procedure. (Examples of these types of medications are: Advil, Arthrotec, Aleve, Coumadin, Ecotrin, Heparin, Ibuprofen, Indocin, Motrin, Naprosyn, Nuprin, Plavix, Vioxx, and Voltarin, or their generic  forms.  This list is not all-inclusive.  Check with your physician or pharmacist before resuming medications.)   __ Eat a soft diet today.  Avoid foods that are poorly digested for the next 24 hours.  These foods would include: nuts, beans, lettuce, red meats, and fried foods. Start with liquids and advance your diet as tolerated, gradually work up to eating solids.   __ Do not have a Barium Study or Enema for one week.    Your physician recommends the additional following instructions:    -You have a contact number available for emergencies. The signs and symptoms of potential delayed complications were discussed with you. You may return to normal activities tomorrow.  -Resume your previous diet or other if specified.  -Continue your present medications.   -We are waiting for your pathology results, if applicable.  -The findings and recommendations have been discussed with you and/or family.  - Please see Medication Reconciliation Form for new medication/medications prescribed.     If you experience any problems or have any questions following discharge from the GI Lab, please call: [phone] from 7 am- 4:30 pm.  In the event of an emergency please go to the closest Emergency Department or call Dr. Lopez @ [phone]

## 2023-12-14 NOTE — PRE-SEDATION DOCUMENTATION
Patient: Sonia Zaidi  MRN: [id number]    Pre-sedation Evaluation:  Sedation necessary for: Analgesia  Requesting service: gi    History of Present Illness: abnormal imaging     Past Medical History:   Diagnosis Date    Anxiety     Asthma     IBS (irritable bowel syndrome)        Principle problems:  Patient Active Problem List    Diagnosis Date Noted    Moderate asthma without complication 2023    IBS (irritable bowel syndrome) 10/10/2023    PMS (premenstrual syndrome) 10/08/2023     Allergies:  No Known Allergies  PTA/Current Medications:  (Not in a hospital admission)    Current Outpatient Medications   Medication Sig Dispense Refill    albuterol 90 mcg/actuation inhaler Inhale 2 puffs every 4 hours if needed for shortness of breath or wheezing. Shortness of breath or wheezing      budesonide (Pulmicort) 0.5 mg/2 mL nebulizer solution Take 2 mL (0.5 mg) by nebulization once daily.      meloxicam (Mobic) 15 mg tablet Take 1 tablet (15 mg) by mouth once daily.      polyethylene glycol (Miralax) 17 gram packet Take 17 g by mouth once daily as needed (1-2times/day as needed for constipation). 1 packet 0    Wixela Inhub 500-50 mcg/dose diskus inhaler Inhale 1 puff 2 times a day.      dexmethylphenidate (Focalin) 5 mg tablet Take 1 tablet (5 mg) by mouth 2 times a day.      drospirenone-ethinyl estradioL (Mahi, Ocella) 3-0.03 mg tablet Take 1 tablet by mouth once daily. as directed      metroNIDAZOLE (Flagyl) 500 mg tablet Take 0.5 tablets (250 mg) by mouth 3 times a day for 10 days. 15 tablet 0    pantoprazole (ProtoNix) 40 mg EC tablet Take 1 tablet (40 mg) by mouth once daily.      wheat dextrin (Benefiber Sugar Free, dextrin,) 3 gram/4 gram powder Take 1 teaspoon daily with a full glass of water (8 ounces) (Patient not taking: Reported on 2023) 248 g 1     No current facility-administered medications for this encounter.     Past Surgical History:   has a past surgical history that includes   section, classic and Umbilical hernia repair.    Recent sedation/surgery (24 hours) No    Review of Systems:  Please check all that apply: No significant medical history    Pregnancy test completed prior to procedure on any menstruating female: negative        NPO guidelines met: Yes    Physical Exam / Plan

## 2023-12-14 NOTE — PRE-SEDATION DOCUMENTATION
Patient: Sonia Zaidi  MRN: [id number]    Pre-sedation Evaluation:  Sedation necessary for: Analgesia  Requesting service: gi    History of Present Illness: abd pain, family hx colon cancer,abnormal imaging     Past Medical History:   Diagnosis Date    Anxiety     Asthma     IBS (irritable bowel syndrome)        Principle problems:  Patient Active Problem List    Diagnosis Date Noted    Moderate asthma without complication 12/14/2023    IBS (irritable bowel syndrome) 10/10/2023    PMS (premenstrual syndrome) 10/08/2023     Allergies:  No Known Allergies  PTA/Current Medications:  (Not in a hospital admission)    Current Outpatient Medications   Medication Sig Dispense Refill    albuterol 90 mcg/actuation inhaler Inhale 2 puffs every 4 hours if needed for shortness of breath or wheezing. Shortness of breath or wheezing      budesonide (Pulmicort) 0.5 mg/2 mL nebulizer solution Take 2 mL (0.5 mg) by nebulization once daily.      meloxicam (Mobic) 15 mg tablet Take 1 tablet (15 mg) by mouth once daily.      polyethylene glycol (Miralax) 17 gram packet Take 17 g by mouth once daily as needed (1-2times/day as needed for constipation). 1 packet 0    Wixela Inhub 500-50 mcg/dose diskus inhaler Inhale 1 puff 2 times a day.      dexmethylphenidate (Focalin) 5 mg tablet Take 1 tablet (5 mg) by mouth 2 times a day.      drospirenone-ethinyl estradioL (Mahi, Ocella) 3-0.03 mg tablet Take 1 tablet by mouth once daily. as directed      metroNIDAZOLE (Flagyl) 500 mg tablet Take 0.5 tablets (250 mg) by mouth 3 times a day for 10 days. 15 tablet 0    pantoprazole (ProtoNix) 40 mg EC tablet Take 1 tablet (40 mg) by mouth once daily.      wheat dextrin (Benefiber Sugar Free, dextrin,) 3 gram/4 gram powder Take 1 teaspoon daily with a full glass of water (8 ounces) (Patient not taking: Reported on 12/14/2023) 248 g 1     No current facility-administered medications for this encounter.     Past Surgical History:   has a past surgical  history that includes  section, classic and Umbilical hernia repair.    Recent sedation/surgery (24 hours) No    Review of Systems:  Please check all that apply: No significant medical history    Pregnancy test completed prior to procedure on any menstruating female: negative        NPO guidelines met: Yes    Physical Exam    Airway  Mallampati: I  TM distance: <3 FB  Neck ROM: full     Cardiovascular - normal exam     Dental - normal exam     Pulmonary - normal exam         Plan    ASA 1     Deep

## 2023-12-14 NOTE — ANESTHESIA POSTPROCEDURE EVALUATION
Patient: Sonia Zaidi    Procedure Summary       Date: 12/14/23 Room / Location: [facility]    Anesthesia Start: 1044 Anesthesia Stop:     Procedure: COLONOSCOPY Diagnosis:       Abnormal CT of the abdomen      Abnormal CT of the abdomen    Scheduled Providers: Elmer Lopez MD; Blanca Rocha RN Responsible Provider: GARETH Crouch    Anesthesia Type: MAC ASA Status: 2            Anesthesia Type: MAC    Vitals Value Taken Time   BP 87/46 12/14/23 1139   Temp 36.5 12/14/23 1139   Pulse 65 12/14/23 1139   Resp 14 12/14/23 1139   SpO2 99 12/14/23 1139       Anesthesia Post Evaluation    Patient location during evaluation: bedside  Patient participation: complete - patient participated  Level of consciousness: sleepy but conscious  Pain score: 0  Pain management: adequate  Airway patency: patent  Cardiovascular status: acceptable  Respiratory status: acceptable  Hydration status: acceptable  Postoperative Nausea and Vomiting: none        There were no known notable events for this encounter.

## 2023-12-15 NOTE — ASSESSMENT & PLAN NOTE
Stable  - continues to feel symptoms here and there   - has been evaluated by [facility] cardiology in 2021- assessed as benign s/p normal EKG and structurally normal heart)   - no medication management needed at this time, will continue with vagal maneuvers prn Subjective  Chief Complaint   Patient presents with    Anxiety     HPI:  Kristina Peterson is a 26 y.o. female. Patient presents to discuss anxiety and panic attacks for the past 3 weeks. Reports history of anxiety since teens. Has been mild enough to manage without medication. Anxiety is causing difficulty concentrating at work. Panic attacks have happened in the past but rarely. Panic attacks are now occurring every 1-2 days. Symptoms include trembling, crying, chest tightness. Panic attacks improve by taking a shower or listening to music. Panic attacks typically occur upon awakening in the morning without warning. Employed as . Denies panic attacks at work. Unable to identify trigger for increasing anxiety and panic attacks. Trying to eat less sugar and increasing exercise. Has not taken medication in the past. Strong family history of anxiety in parents and brother. Parents take benzos as needed. Sertraline did not help fatherRaysaer was better. Does not know what brother takes. Has never seen a counselor.          Past Medical History:   Diagnosis Date    Urinary reflux     as a baby     Family History   Problem Relation Age of Onset    No Known Problems Father     No Known Problems Mother      Social History     Socioeconomic History    Marital status: Single     Spouse name: Not on file    Number of children: Not on file    Years of education: Not on file    Highest education level: Not on file   Occupational History    Not on file   Tobacco Use    Smoking status: Never    Smokeless tobacco: Never   Vaping Use    Vaping Use: Never used   Substance and Sexual Activity    Alcohol use: Not Currently    Drug use: Not Currently    Sexual activity: Not on file   Other Topics Concern    Not on file   Social History Narrative    Not on file     Social Determinants of Health     Financial Resource Strain: Low Risk  (9/11/2023)    Overall Financial Resource Strain (CARDIA)     Difficulty of Paying Living

## 2023-12-20 ENCOUNTER — TELEMEDICINE (OUTPATIENT)
Dept: OBSTETRICS AND GYNECOLOGY | Facility: CLINIC | Age: 38
End: 2023-12-20
Payer: COMMERCIAL

## 2023-12-20 DIAGNOSIS — N94.3 PMS (PREMENSTRUAL SYNDROME): Primary | ICD-10-CM

## 2023-12-20 PROCEDURE — 99213 OFFICE O/P EST LOW 20 MIN: CPT | Performed by: ADVANCED PRACTICE MIDWIFE

## 2023-12-20 NOTE — PROGRESS NOTES
"Virtual or Telephone Consent    An interactive audio and video telecommunication system which permits real time communications between the patient (at the originating site) and provider (at the distant site) was utilized to provide this telehealth service.   Verbal consent was requested and obtained from Sonia Zaidi on this date, 12/20/23 for a telehealth visit.      Assessment/Plan   Diagnoses and all orders for this visit:  PMS (premenstrual syndrome)  Continue supportive therapies  Treat GI bacterial overgrowth  \"Come as you are\" book for libido recommended  RTC annual exam    STORM Rhodes    Subjective   Sonia Zaidi is a 38 y.o. female who presents for BC FOLLOW UP     Patient given MAHI stated she has not been taking it makes her \"crazy\"    STORM Rhodes    HPI    Pt presents to follow up PMS symptoms and pelvic ultrasound  Mahi did not agree with her  Was diagnosed with a bacterial overgrowth in gut which she plans to treat, hoping this will help  Signed up for acupuncture  Working out 4-5x weekly  Read books on nutrition and hormones  Thinks taking holistic route is the way she will continue    Menstrual History:  OB History    No obstetric history on file.           Objective   There were no vitals taken for this visit.  Ultrasound reviewed    Physical Exam  Virtual visit  Does not appear in acute distress    "

## 2023-12-22 LAB
LABORATORY COMMENT REPORT: NORMAL
PATH REPORT.FINAL DX SPEC: NORMAL
PATH REPORT.GROSS SPEC: NORMAL
PATH REPORT.TOTAL CANCER: NORMAL

## 2023-12-28 ENCOUNTER — TELEPHONE (OUTPATIENT)
Dept: GASTROENTEROLOGY | Facility: EXTERNAL LOCATION | Age: 38
End: 2023-12-28
Payer: COMMERCIAL

## 2023-12-29 ENCOUNTER — ANCILLARY PROCEDURE (OUTPATIENT)
Dept: RADIOLOGY | Facility: CLINIC | Age: 38
End: 2023-12-29
Payer: COMMERCIAL

## 2023-12-29 DIAGNOSIS — G89.29 OTHER CHRONIC PAIN: ICD-10-CM

## 2023-12-29 DIAGNOSIS — M54.41 LUMBAGO WITH SCIATICA, RIGHT SIDE: ICD-10-CM

## 2023-12-29 PROCEDURE — 72148 MRI LUMBAR SPINE W/O DYE: CPT

## 2023-12-29 PROCEDURE — 72148 MRI LUMBAR SPINE W/O DYE: CPT | Performed by: STUDENT IN AN ORGANIZED HEALTH CARE EDUCATION/TRAINING PROGRAM

## 2024-02-12 DIAGNOSIS — R14.0 BLOATING: ICD-10-CM

## 2024-02-12 DIAGNOSIS — K58.9 IRRITABLE BOWEL SYNDROME, UNSPECIFIED TYPE: Primary | ICD-10-CM

## 2024-02-19 ENCOUNTER — OFFICE VISIT (OUTPATIENT)
Dept: PRIMARY CARE | Facility: CLINIC | Age: 39
End: 2024-02-19
Payer: COMMERCIAL

## 2024-02-19 VITALS
WEIGHT: 123.9 LBS | SYSTOLIC BLOOD PRESSURE: 108 MMHG | DIASTOLIC BLOOD PRESSURE: 70 MMHG | HEIGHT: 66 IN | BODY MASS INDEX: 19.91 KG/M2 | TEMPERATURE: 97.7 F

## 2024-02-19 DIAGNOSIS — R91.1 PULMONARY NODULE: ICD-10-CM

## 2024-02-19 DIAGNOSIS — M54.41 CHRONIC BILATERAL LOW BACK PAIN WITH RIGHT-SIDED SCIATICA: ICD-10-CM

## 2024-02-19 DIAGNOSIS — G89.29 CHRONIC BILATERAL LOW BACK PAIN WITH RIGHT-SIDED SCIATICA: ICD-10-CM

## 2024-02-19 DIAGNOSIS — F90.9 ATTENTION DEFICIT HYPERACTIVITY DISORDER (ADHD), UNSPECIFIED ADHD TYPE: Primary | ICD-10-CM

## 2024-02-19 PROCEDURE — 1036F TOBACCO NON-USER: CPT | Performed by: FAMILY MEDICINE

## 2024-02-19 PROCEDURE — 99203 OFFICE O/P NEW LOW 30 MIN: CPT | Performed by: FAMILY MEDICINE

## 2024-02-19 RX ORDER — METRONIDAZOLE 500 MG/1
500 TABLET ORAL 3 TIMES DAILY
COMMUNITY

## 2024-02-19 ASSESSMENT — PATIENT HEALTH QUESTIONNAIRE - PHQ9
2. FEELING DOWN, DEPRESSED OR HOPELESS: NOT AT ALL
SUM OF ALL RESPONSES TO PHQ9 QUESTIONS 1 AND 2: 0
1. LITTLE INTEREST OR PLEASURE IN DOING THINGS: NOT AT ALL

## 2024-02-19 NOTE — PROGRESS NOTES
"Subjective   Patient ID: Sonia Zaidi is a 38 y.o. female who presents for [facility].    Pt presents to \[facility]\"" care:       P Med Hx: asthma  ADHD- on and off meds  SVT  She is following with GI, chronic constipation x 10 years     Low Back Pain  MRI 12/23  Radiates down her right leg at times   She was referred to PT, had not yet gone      P Surg Hx: reviewed in EMR  C sections x 2      HM: up to date on PAP  UTD on dental and vision exams         Review of Systems    Objective   /70 (BP Location: Right arm, Patient Position: Sitting)   Temp 36.5 °C (97.7 °F)   Ht 1.676 m (5' 6\")   Wt 56.2 kg (123 lb 14.4 oz)   BMI 20.00 kg/m²     Physical Exam  Vitals and nursing note reviewed.   Constitutional:       Appearance: Normal appearance.   Cardiovascular:      Rate and Rhythm: Normal rate and regular rhythm.      Heart sounds: Normal heart sounds.   Pulmonary:      Effort: Pulmonary effort is normal.      Breath sounds: Normal breath sounds.   Abdominal:      General: Bowel sounds are normal.      Palpations: Abdomen is soft.   Musculoskeletal:      Lumbar back: Spasms and tenderness present.   Skin:     General: Skin is warm and dry.   Neurological:      General: No focal deficit present.      Mental Status: She is alert and oriented to person, place, and time.      Cranial Nerves: No cranial nerve deficit.      Sensory: No sensory deficit.      Motor: No weakness.      Coordination: Coordination normal.      Gait: Gait normal.      Deep Tendon Reflexes: Reflexes normal.   Psychiatric:         Mood and Affect: Mood normal.         Behavior: Behavior normal.         Thought Content: Thought content normal.         Judgment: Judgment normal.       Pt presents to \[facility]\"" care, P Med Hx, P Surg Hx, Family Hx, Meds and Allergies all reviewed     Assessment/Plan   Diagnoses and all orders for this visit:  Attention deficit hyperactivity disorder (ADHD), unspecified ADHD type  -     Referral to Neurology; " Future  Chronic bilateral low back pain with right-sided sciatica  -     Referral to Physical Therapy; Future  Pulmonary nodule    Repeat CT scan one year 10/24    Charlene Espinoza,

## 2024-02-21 DIAGNOSIS — R14.0 BLOATING: ICD-10-CM

## 2024-02-21 DIAGNOSIS — K58.9 IRRITABLE BOWEL SYNDROME, UNSPECIFIED TYPE: ICD-10-CM

## 2024-04-10 ENCOUNTER — APPOINTMENT (OUTPATIENT)
Dept: DERMATOLOGY | Facility: CLINIC | Age: 39
End: 2024-04-10
Payer: COMMERCIAL

## 2024-05-16 ENCOUNTER — OFFICE VISIT (OUTPATIENT)
Dept: NEUROLOGY | Facility: CLINIC | Age: 39
End: 2024-05-16
Payer: COMMERCIAL

## 2024-05-16 VITALS
BODY MASS INDEX: 22.67 KG/M2 | HEART RATE: 75 BPM | HEIGHT: 64 IN | WEIGHT: 132.8 LBS | SYSTOLIC BLOOD PRESSURE: 131 MMHG | DIASTOLIC BLOOD PRESSURE: 90 MMHG

## 2024-05-16 DIAGNOSIS — G93.40 ENCEPHALOPATHY: Primary | ICD-10-CM

## 2024-05-16 DIAGNOSIS — F90.2 ATTENTION DEFICIT HYPERACTIVITY DISORDER (ADHD), COMBINED TYPE: ICD-10-CM

## 2024-05-16 PROCEDURE — 1036F TOBACCO NON-USER: CPT | Performed by: PSYCHIATRY & NEUROLOGY

## 2024-05-16 PROCEDURE — 80360 METHYLPHENIDATE: CPT

## 2024-05-16 PROCEDURE — 99205 OFFICE O/P NEW HI 60 MIN: CPT | Performed by: PSYCHIATRY & NEUROLOGY

## 2024-05-16 RX ORDER — DEXMETHYLPHENIDATE HYDROCHLORIDE 5 MG/1
5 TABLET ORAL 2 TIMES DAILY
Qty: 60 TABLET | Refills: 0 | Status: SHIPPED | OUTPATIENT
Start: 2024-05-16 | End: 2024-06-15

## 2024-05-16 RX ORDER — DEXMETHYLPHENIDATE HYDROCHLORIDE 5 MG/1
5 TABLET ORAL 2 TIMES DAILY
Qty: 60 TABLET | Refills: 0 | Status: SHIPPED | OUTPATIENT
Start: 2024-07-15 | End: 2024-08-14

## 2024-05-16 RX ORDER — DEXMETHYLPHENIDATE HYDROCHLORIDE 5 MG/1
5 TABLET ORAL 2 TIMES DAILY
Qty: 60 TABLET | Refills: 0 | Status: SHIPPED | OUTPATIENT
Start: 2024-06-15

## 2024-05-16 ASSESSMENT — PATIENT HEALTH QUESTIONNAIRE - PHQ9
1. LITTLE INTEREST OR PLEASURE IN DOING THINGS: NOT AT ALL
2. FEELING DOWN, DEPRESSED OR HOPELESS: NOT AT ALL
SUM OF ALL RESPONSES TO PHQ9 QUESTIONS 1 & 2: 0
2. FEELING DOWN, DEPRESSED OR HOPELESS: NOT AT ALL
1. LITTLE INTEREST OR PLEASURE IN DOING THINGS: NOT AT ALL
SUM OF ALL RESPONSES TO PHQ9 QUESTIONS 1 AND 2: 0

## 2024-05-16 ASSESSMENT — ENCOUNTER SYMPTOMS
NERVOUS/ANXIOUS: 1
DECREASED CONCENTRATION: 1
PALPITATIONS: 1

## 2024-05-16 NOTE — PROGRESS NOTES
Sonia Austinjuan r  39 y.o.       SUBJECTIVE    ADHD       Sonia is a 39-year-old young lady who was seen today for evaluation of her possible attention deficit disorder with difficulty at work at home.  She was diagnosed with ADHD when she was in college and had an extensive workup done and has been tried on Adderall which gave her side effects Ritalin which did not seem to work that well for her and then Focalin which seems to be working very well for her.  Right now she is working full-time and takes Focalin 5 mg half a tablet to full tablet a day as needed for work.  Today her physical and neurological examination is normal.  Based on the diagnostic checklist history in the past to determine.  She has combined type of ADHD and I would like to continue Focalin 5 mg twice a day the way she is taking and depending on how she does might make future recommendation when she comes back to see me in 3 to 4 months    I have discussed the controlled substance policy, abuse of potential, risk benefit and the precautions to be taken which she understood.    As you recall, Sonia is a 39-year-old young lady who has been having difficulty since early childhood but according to her she did have problem in elementary middle school and high school where she was having hard time paying attention focusing and getting her work done.  She was diagnosed with ADHD in college.  Today on the diagnostic checklist she had 6 out of 9 for inattentive and 4 out of 9 for Hyper-Tet type of ADHD    Her birth and developmental history's were unremarkable and she did graduate from college and currently works for [employer]    She lives with her  and has 2 daughters out of which 1 daughter has ADHD and currently on Metadate.    Her past medical history is remarkable for SVT and had a complete cardiac workup which according to her was reportedly normal and was advised to do Valsalva maneuver whenever she is having tachycardia    No  "history of any smoking alcohol and substance abuse.      Due to technical limitations of voice recognition and human error, this note may not accurately reflect the care of the patient.    Review of Systems   Cardiovascular:  Positive for palpitations.   Psychiatric/Behavioral:  Positive for decreased concentration. The patient is nervous/anxious.         Patient Active Problem List   Diagnosis    PMS (premenstrual syndrome)    IBS (irritable bowel syndrome)    Moderate asthma without complication ([facility]-HCC)     Past Medical History:   Diagnosis Date    Anxiety     Asthma ([facility]-HCC)     IBS (irritable bowel syndrome)      Past Surgical History:   Procedure Laterality Date     SECTION, CLASSIC      UMBILICAL HERNIA REPAIR         reports that she has never smoked. She has never used smokeless tobacco. She reports current alcohol use. She reports that she does not use drugs.    /90   Pulse 75   Ht 1.626 m (5' 4\")   Wt 60.2 kg (132 lb 12.8 oz)   BMI 22.80 kg/m²     OBJECTIVE  Physical Exam/Neurological Exam   Constitutional: General appearance: no acute distress   Auscultation of Heart: Regular rate and rhythm, no murmurs, normal S1 and S2.   Carotid Arteries: Intact without any bruits.   Neck is supple.   No lymph adenopathy.   Peripheral Vascular Exam: Pulses +2 and equal in all extremities. No swelling, varicosities, edema or tenderness to palpations.    Abdomen is soft, nondistended. No organomegaly.  Mental status: The patient was in no distress, alert, interactive and cooperative. Affect is appropriate.   Orientation: oriented to person, oriented to place and oriented to time.   Memory: recent memory intact and remote memory intact.   Attention: normal attention span and normal concentrating ability.   Language: normal comprehension and no difficulty naming common objects.   Fund of knowledge: Patient displays adequate knowledge of current events, adequate fund of knowledge regarding past history " and adequate fund of knowledge regarding vocabulary.   Eyes: The ophthalmoscopic examination was normal. The fundi are visualized with normal disc margins and without.  Cranial nerve II: Visual fields full to confrontation.   Cranial nerves III, IV, and VI: Pupils round, equally reactive to light; no ptosis. EOMs intact. No nystagmus.   Cranial Nerve V: Facial sensation intact bilaterally.   Cranial nerve VII: Normal and symmetric facial strength.   Cranial nerve VIII: Hearing is intact bilaterally to finger rub / whisper.   Cranial nerves IX and X: Palate elevates symmetrically.   Cranial nerve XI: Shoulder shrug and neck rotation strength are intact.   Cranial nerve XII: Tongue midline with normal strength.   Motor: Motor exam was normal. Muscle bulk was normal in both upper and lower extremities. Muscle tone was normal in both upper and lower extremities. Muscle strength was 5/5 throughout. no abnormal or adventitious movements were present.   Deep Tendon Reflexes: left biceps 2+ , right biceps 2+, left triceps 2+, right triceps 2+, left brachioradialis 2+, right brachioradialis 2+, left patella 2+, right patella 2+, left ankle jerk 2+, right ankle jerk 2+   Plantar Reflex: Toes downgoing to plantar stimulation on the left. Toes downgoing to plantar stimulation on the right.   Sensory Exam: Normal to light touch.   Coordination: There is no limb dystaxia and rapid alternating movements are intact.  Gait: Gait is normal without spasticity, ataxia or bradykinesia. Stance is stable with a negative Romberg.      ASSESSMENT/PLAN  Diagnoses and all orders for this visit:  Encephalopathy  Attention deficit hyperactivity disorder (ADHD), combined type  -     dexmethylphenidate (Focalin) 5 mg tablet; Take 1 tablet (5 mg) by mouth 2 times a day.  -     dexmethylphenidate (Focalin) 5 mg tablet; Take 1 tablet (5 mg) by mouth 2 times a day. Do not fill before July 15, 2024.  -     dexmethylphenidate (Focalin) 5 mg tablet;  Take 1 tablet (5 mg) by mouth 2 times a day. Do not fill before Lisa 15, 2024.        Nikolas Pereira MD  5/16/2024  11:23 AM

## 2024-05-21 LAB
ME-PHENIDATE UR-MCNC: <10 NG/ML
PPAA UR-MCNC: <50 NG/ML

## 2024-06-05 ENCOUNTER — APPOINTMENT (OUTPATIENT)
Dept: NEUROLOGY | Facility: CLINIC | Age: 39
End: 2024-06-05
Payer: COMMERCIAL

## 2024-06-12 ENCOUNTER — OFFICE VISIT (OUTPATIENT)
Dept: OBSTETRICS AND GYNECOLOGY | Facility: CLINIC | Age: 39
End: 2024-06-12
Payer: COMMERCIAL

## 2024-06-12 VITALS
HEIGHT: 64 IN | DIASTOLIC BLOOD PRESSURE: 80 MMHG | SYSTOLIC BLOOD PRESSURE: 110 MMHG | OXYGEN SATURATION: 99 % | BODY MASS INDEX: 22.07 KG/M2 | WEIGHT: 129.25 LBS | HEART RATE: 85 BPM

## 2024-06-12 DIAGNOSIS — N64.4 MASTODYNIA: Primary | ICD-10-CM

## 2024-06-12 PROCEDURE — 99213 OFFICE O/P EST LOW 20 MIN: CPT | Performed by: ADVANCED PRACTICE MIDWIFE

## 2024-06-12 NOTE — PROGRESS NOTES
"Assessment/Plan   Diagnoses and all orders for this visit:  Mastodynia  -     BI mammo bilateral diagnostic tomosynthesis; Future  -     BI US breast limited left; Future  -     CBC; Future    CBC to rule out infectious process as L breast pain near axilla  Follow up per results  If pains persistent refer for consult with breast specialist given degree of discomfort to patient    Subjective   Sonia Zaidi is a 39 y.o. female who presents for breast pain, both side.  L>R, wakes in night, persists beyond cycle.   Has had bilateral breast pain with cycles for some time but this is different.    Menstrual History:  OB History    No obstetric history on file.        Patient's last menstrual period was 05/22/2024.         ROS as in HPI    Objective   /80   Pulse 85   Ht 1.626 m (5' 4\")   Wt 58.6 kg (129 lb 4 oz)   LMP 05/22/2024   SpO2 99%   BMI 22.19 kg/m²     Physical Exam  Constitutional:       General: She is not in acute distress.  Pulmonary:      Effort: Pulmonary effort is normal.   Chest:      Chest wall: Tenderness present. No mass, lacerations, deformity or swelling.   Breasts:     Right: Tenderness (generalized) present. No swelling, bleeding, inverted nipple, mass, nipple discharge or skin change.      Left: Tenderness (upper outer quadrant greater than rest of breast) present. No swelling, bleeding, inverted nipple, mass, nipple discharge or skin change.   Skin:     General: Skin is warm and dry.      Findings: No lesion.   Neurological:      Mental Status: She is alert.           "

## 2024-08-22 PROBLEM — R10.84 GENERALIZED ABDOMINAL PAIN: Status: ACTIVE | Noted: 2024-08-22

## 2024-08-22 PROBLEM — Z86.59 HISTORY OF ADHD: Status: ACTIVE | Noted: 2022-11-21

## 2024-08-22 PROBLEM — I47.10 SVT (SUPRAVENTRICULAR TACHYCARDIA) (CMS-HCC): Status: ACTIVE | Noted: 2022-11-21

## 2024-08-22 PROBLEM — Z98.891 HISTORY OF CESAREAN SECTION: Status: ACTIVE | Noted: 2024-08-22

## 2024-08-22 PROBLEM — G89.29 CHRONIC BILATERAL LOW BACK PAIN WITH RIGHT-SIDED SCIATICA: Status: ACTIVE | Noted: 2023-09-15

## 2024-08-22 PROBLEM — N93.9 ABNORMAL VAGINAL BLEEDING: Status: ACTIVE | Noted: 2023-09-15

## 2024-08-22 PROBLEM — K21.9 GASTROESOPHAGEAL REFLUX DISEASE WITHOUT ESOPHAGITIS: Status: ACTIVE | Noted: 2022-11-21

## 2024-08-22 PROBLEM — M54.41 CHRONIC BILATERAL LOW BACK PAIN WITH RIGHT-SIDED SCIATICA: Status: ACTIVE | Noted: 2023-09-15

## 2024-08-22 PROBLEM — F90.9 ADHD: Status: ACTIVE | Noted: 2024-08-22

## 2024-08-22 PROBLEM — F41.9 ANXIETY: Status: ACTIVE | Noted: 2018-10-05

## 2024-08-22 PROBLEM — Z86.19 HISTORY OF SHINGLES: Status: ACTIVE | Noted: 2024-08-22

## 2024-08-27 ENCOUNTER — APPOINTMENT (OUTPATIENT)
Dept: NEUROLOGY | Facility: CLINIC | Age: 39
End: 2024-08-27
Payer: COMMERCIAL

## 2024-08-27 VITALS
WEIGHT: 131 LBS | DIASTOLIC BLOOD PRESSURE: 76 MMHG | HEART RATE: 94 BPM | HEIGHT: 64 IN | SYSTOLIC BLOOD PRESSURE: 108 MMHG | BODY MASS INDEX: 22.36 KG/M2

## 2024-08-27 DIAGNOSIS — F41.9 ANXIETY: ICD-10-CM

## 2024-08-27 DIAGNOSIS — M54.41 CHRONIC BILATERAL LOW BACK PAIN WITH RIGHT-SIDED SCIATICA: ICD-10-CM

## 2024-08-27 DIAGNOSIS — F90.0 ATTENTION DEFICIT HYPERACTIVITY DISORDER (ADHD), PREDOMINANTLY INATTENTIVE TYPE: ICD-10-CM

## 2024-08-27 DIAGNOSIS — G93.40 ENCEPHALOPATHY: Primary | ICD-10-CM

## 2024-08-27 DIAGNOSIS — G89.29 CHRONIC BILATERAL LOW BACK PAIN WITH RIGHT-SIDED SCIATICA: ICD-10-CM

## 2024-08-27 DIAGNOSIS — I47.10 SVT (SUPRAVENTRICULAR TACHYCARDIA) (CMS-HCC): ICD-10-CM

## 2024-08-27 PROCEDURE — 99214 OFFICE O/P EST MOD 30 MIN: CPT | Performed by: PSYCHIATRY & NEUROLOGY

## 2024-08-27 PROCEDURE — 3008F BODY MASS INDEX DOCD: CPT | Performed by: PSYCHIATRY & NEUROLOGY

## 2024-08-27 PROCEDURE — 1036F TOBACCO NON-USER: CPT | Performed by: PSYCHIATRY & NEUROLOGY

## 2024-08-27 RX ORDER — SERDEXMETHYLPHENIDATE AND DEXMETHYLPHENIDATE 5.2; 26.1 MG/1; MG/1
1 CAPSULE ORAL DAILY
Qty: 30 CAPSULE | Refills: 0 | Status: SHIPPED | OUTPATIENT
Start: 2024-09-26

## 2024-08-27 RX ORDER — SERDEXMETHYLPHENIDATE AND DEXMETHYLPHENIDATE 5.2; 26.1 MG/1; MG/1
1 CAPSULE ORAL DAILY
Qty: 30 CAPSULE | Refills: 0 | Status: SHIPPED | OUTPATIENT
Start: 2024-10-25

## 2024-08-27 RX ORDER — SERDEXMETHYLPHENIDATE AND DEXMETHYLPHENIDATE 5.2; 26.1 MG/1; MG/1
1 CAPSULE ORAL DAILY
Qty: 30 CAPSULE | Refills: 0 | Status: SHIPPED | OUTPATIENT
Start: 2024-08-27

## 2024-08-27 ASSESSMENT — ENCOUNTER SYMPTOMS
TROUBLE SWALLOWING: 0
NUMBNESS: 0
HEADACHES: 0
LIGHT-HEADEDNESS: 0
NAUSEA: 0
FREQUENCY: 0
ABDOMINAL PAIN: 0
DIFFICULTY URINATING: 0
SHORTNESS OF BREATH: 0
COUGH: 0
HYPERACTIVE: 0
PHOTOPHOBIA: 0
ARTHRALGIAS: 0
WEAKNESS: 0
AGITATION: 0
EYE PAIN: 0
JOINT SWELLING: 0
NECK PAIN: 0
HALLUCINATIONS: 0
DECREASED CONCENTRATION: 1
UNEXPECTED WEIGHT CHANGE: 0
ADENOPATHY: 0
TREMORS: 0
WHEEZING: 0
SPEECH DIFFICULTY: 0
FATIGUE: 0
SLEEP DISTURBANCE: 1
DIZZINESS: 0
FEVER: 0
SEIZURES: 0
PALPITATIONS: 1
CONFUSION: 0
BACK PAIN: 0
BRUISES/BLEEDS EASILY: 0
NECK STIFFNESS: 0
SINUS PRESSURE: 0
NERVOUS/ANXIOUS: 1
FACIAL ASYMMETRY: 0
VOMITING: 0

## 2024-08-27 ASSESSMENT — PATIENT HEALTH QUESTIONNAIRE - PHQ9
1. LITTLE INTEREST OR PLEASURE IN DOING THINGS: NOT AT ALL
SUM OF ALL RESPONSES TO PHQ9 QUESTIONS 1 & 2: 0
2. FEELING DOWN, DEPRESSED OR HOPELESS: NOT AT ALL

## 2024-08-27 NOTE — PROGRESS NOTES
Sonia Zaidi  39 y.o.       SUBJECTIVE    Encephalopathy  Presenting symptoms: no confusion    Associated symptoms: palpitations    Associated symptoms: no abdominal pain, no agitation, no fever, no hallucinations, no headaches, no light-headedness, no nausea, no rash, no seizures, no vomiting and no weakness       Sonia is a 39-year-old young lady who was seen today for follow-up of her encephalopathy due to attention deficit disorder with difficulty with work and home.  Since last seen she is doing better on the medicine but she does not like the way she feels when she is coming of the medicine.  She has been tried on Focalin and Adderall and few of the medication in the past and had similar effect.  Today her physical and neurological examination was normal.  She has a history of supraventricular tachycardia.  I would like to change her medicine to Azstarys 26.1 mg and advised her to discuss that with her cardiologist and if she is having any cardiac symptoms may stop the medication.  Have discussed the controlled substance policy, abuse potential risk and benefit and if she is still having issues may switch her to Focalin XR at a later date.  I have scheduled to come back and see me in 2 to 3 months    I did review the medication list.  Due to technical limitations of voice recognition and human error, this note may not accurately reflect the care of the patient.    Review of Systems   Constitutional:  Negative for fatigue, fever and unexpected weight change.   HENT:  Negative for dental problem, ear pain, hearing loss, sinus pressure, tinnitus and trouble swallowing.    Eyes:  Negative for photophobia, pain and visual disturbance.   Respiratory:  Negative for cough, shortness of breath and wheezing.    Cardiovascular:  Positive for palpitations. Negative for chest pain and leg swelling.   Gastrointestinal:  Negative for abdominal pain, nausea and vomiting.   Genitourinary:  Negative for difficulty urinating,  "enuresis and frequency.   Musculoskeletal:  Negative for arthralgias, back pain, joint swelling, neck pain and neck stiffness.   Skin:  Negative for pallor and rash.   Allergic/Immunologic: Negative for food allergies.   Neurological:  Negative for dizziness, tremors, seizures, syncope, facial asymmetry, speech difficulty, weakness, light-headedness, numbness and headaches.   Hematological:  Negative for adenopathy. Does not bruise/bleed easily.   Psychiatric/Behavioral:  Positive for decreased concentration and sleep disturbance. Negative for agitation, behavioral problems, confusion and hallucinations. The patient is nervous/anxious. The patient is not hyperactive.         Patient Active Problem List   Diagnosis    PMS (premenstrual syndrome)    IBS (irritable bowel syndrome)    Moderate asthma without complication ([facility]-H[facility])    Abnormal vaginal bleeding    ADHD    Anxiety    Chronic bilateral low back pain with right-sided sciatica    Enlarged lymph nodes    Gastroesophageal reflux disease without esophagitis    Generalized abdominal pain    History of ADHD    History of  section    History of shingles    Myopia with astigmatism    Pain in joint, lower leg    SVT (supraventricular tachycardia) (CMS-HCC)    Tear film insufficiency    Asthma ([facility]-HCC)     Past Medical History:   Diagnosis Date    Anxiety     Asthma ([facility]-HCC)     IBS (irritable bowel syndrome)      Past Surgical History:   Procedure Laterality Date     SECTION, CLASSIC      UMBILICAL HERNIA REPAIR         reports that she has never smoked. She has never used smokeless tobacco. She reports current alcohol use. She reports that she does not use drugs.    /76 (BP Location: Left arm, Patient Position: Sitting, BP Cuff Size: Small adult)   Pulse 94   Ht 1.626 m (5' 4\")   Wt 59.4 kg (131 lb)   BMI 22.49 kg/m²     OBJECTIVE  Physical Exam/Neurological Exam   Constitutional: General appearance: no acute distress   Auscultation of " Heart: Regular rate and rhythm, no murmurs, normal S1 and S2.   Carotid Arteries: Intact without any bruits.   Neck is supple.   No lymph adenopathy.   Peripheral Vascular Exam: Pulses +2 and equal in all extremities. No swelling, varicosities, edema or tenderness to palpations.    Abdomen is soft, nondistended. No organomegaly.  Mental status: The patient was in no distress, alert, interactive and cooperative. Affect is appropriate.   Orientation: oriented to person, oriented to place and oriented to time.   Memory: recent memory intact and remote memory intact.   Attention: normal attention span and normal concentrating ability.   Language: normal comprehension and no difficulty naming common objects.   Fund of knowledge: Patient displays adequate knowledge of current events, adequate fund of knowledge regarding past history and adequate fund of knowledge regarding vocabulary.   Eyes: The ophthalmoscopic examination was normal. The fundi are visualized with normal disc margins and without.  Cranial nerve II: Visual fields full to confrontation.   Cranial nerves III, IV, and VI: Pupils round, equally reactive to light; no ptosis. EOMs intact. No nystagmus.   Cranial Nerve V: Facial sensation intact bilaterally.   Cranial nerve VII: Normal and symmetric facial strength.   Cranial nerve VIII: Hearing is intact bilaterally to finger rub / whisper.   Cranial nerves IX and X: Palate elevates symmetrically.   Cranial nerve XI: Shoulder shrug and neck rotation strength are intact.   Cranial nerve XII: Tongue midline with normal strength.   Motor: Motor exam was normal. Muscle bulk was normal in both upper and lower extremities. Muscle tone was normal in both upper and lower extremities. Muscle strength was 5/5 throughout. no abnormal or adventitious movements were present.   Deep Tendon Reflexes: left biceps 2+ , right biceps 2+, left triceps 2+, right triceps 2+, left brachioradialis 2+, right brachioradialis 2+, left  patella 2+, right patella 2+, left ankle jerk 2+, right ankle jerk 2+   Plantar Reflex: Toes downgoing to plantar stimulation on the left. Toes downgoing to plantar stimulation on the right.   Sensory Exam: Normal to light touch.   Coordination: There is no limb dystaxia and rapid alternating movements are intact.  Gait: Gait is normal without spasticity, ataxia or bradykinesia. Stance is stable with a negative Romberg.      ASSESSMENT/PLAN  Diagnoses and all orders for this visit:  Encephalopathy  Attention deficit hyperactivity disorder (ADHD), predominantly inattentive type  -     serdexmethylphen-dexmethylphen (Azstarys) 26.1 mg- 5.2 mg capsule; Take 1 capsule by mouth once daily. Do not fill before October 25, 2024.  -     serdexmethylphen-dexmethylphen (Azstarys) 26.1 mg- 5.2 mg capsule; Take 1 capsule by mouth once daily. Do not fill before September 26, 2024.  -     serdexmethylphen-dexmethylphen (Azstarys) 26.1 mg- 5.2 mg capsule; Take 1 capsule by mouth once daily.  SVT (supraventricular tachycardia) (CMS-[facility])  Chronic bilateral low back pain with right-sided sciatica  Anxiety        Nikolas Pereira MD  8/27/2024  10:56 AM

## 2024-10-21 ENCOUNTER — HOSPITAL ENCOUNTER (OUTPATIENT)
Dept: RADIOLOGY | Facility: CLINIC | Age: 39
End: 2024-10-21
Payer: COMMERCIAL

## 2024-10-21 DIAGNOSIS — N94.3 PMS (PREMENSTRUAL SYNDROME): Primary | ICD-10-CM

## 2024-10-21 DIAGNOSIS — Z12.31 SCREENING MAMMOGRAM FOR BREAST CANCER: ICD-10-CM

## 2024-10-25 ENCOUNTER — APPOINTMENT (OUTPATIENT)
Dept: RADIOLOGY | Facility: CLINIC | Age: 39
End: 2024-10-25
Payer: COMMERCIAL

## 2024-11-02 ENCOUNTER — LAB (OUTPATIENT)
Dept: LAB | Facility: LAB | Age: 39
End: 2024-11-02
Payer: COMMERCIAL

## 2024-11-02 DIAGNOSIS — N64.4 MASTODYNIA: ICD-10-CM

## 2024-11-02 DIAGNOSIS — N94.3 PMS (PREMENSTRUAL SYNDROME): ICD-10-CM

## 2024-11-02 LAB
ERYTHROCYTE [DISTWIDTH] IN BLOOD BY AUTOMATED COUNT: 12.4 % (ref 11.5–14.5)
HCT VFR BLD AUTO: 39.8 % (ref 36–46)
HGB BLD-MCNC: 12.9 G/DL (ref 12–16)
MCH RBC QN AUTO: 31.6 PG (ref 26–34)
MCHC RBC AUTO-ENTMCNC: 32.4 G/DL (ref 32–36)
MCV RBC AUTO: 98 FL (ref 80–100)
NRBC BLD-RTO: 0 /100 WBCS (ref 0–0)
PLATELET # BLD AUTO: 221 X10*3/UL (ref 150–450)
RBC # BLD AUTO: 4.08 X10*6/UL (ref 4–5.2)
TSH SERPL-ACNC: 0.93 MIU/L (ref 0.44–3.98)
WBC # BLD AUTO: 4.9 X10*3/UL (ref 4.4–11.3)

## 2024-11-02 PROCEDURE — 36415 COLL VENOUS BLD VENIPUNCTURE: CPT

## 2024-11-02 PROCEDURE — 85027 COMPLETE CBC AUTOMATED: CPT

## 2024-11-02 PROCEDURE — 84443 ASSAY THYROID STIM HORMONE: CPT

## 2024-11-15 ENCOUNTER — HOSPITAL ENCOUNTER (OUTPATIENT)
Dept: RADIOLOGY | Facility: HOSPITAL | Age: 39
Discharge: HOME | End: 2024-11-15
Payer: COMMERCIAL

## 2024-11-15 VITALS — WEIGHT: 128 LBS | BODY MASS INDEX: 21.85 KG/M2 | HEIGHT: 64 IN

## 2024-11-15 DIAGNOSIS — N64.4 MASTODYNIA: ICD-10-CM

## 2024-11-15 PROCEDURE — 77062 BREAST TOMOSYNTHESIS BI: CPT

## 2024-11-20 ENCOUNTER — APPOINTMENT (OUTPATIENT)
Dept: NEUROLOGY | Facility: CLINIC | Age: 39
End: 2024-11-20
Payer: COMMERCIAL

## 2024-11-20 VITALS
DIASTOLIC BLOOD PRESSURE: 60 MMHG | HEIGHT: 64 IN | HEART RATE: 95 BPM | WEIGHT: 131.8 LBS | SYSTOLIC BLOOD PRESSURE: 110 MMHG | BODY MASS INDEX: 22.5 KG/M2

## 2024-11-20 DIAGNOSIS — F90.0 ATTENTION DEFICIT HYPERACTIVITY DISORDER (ADHD), PREDOMINANTLY INATTENTIVE TYPE: ICD-10-CM

## 2024-11-20 DIAGNOSIS — F90.2 ATTENTION DEFICIT HYPERACTIVITY DISORDER (ADHD), COMBINED TYPE: ICD-10-CM

## 2024-11-20 DIAGNOSIS — G93.40 ENCEPHALOPATHY, UNSPECIFIED TYPE: Primary | ICD-10-CM

## 2024-11-20 PROCEDURE — 1036F TOBACCO NON-USER: CPT | Performed by: PSYCHIATRY & NEUROLOGY

## 2024-11-20 PROCEDURE — 99214 OFFICE O/P EST MOD 30 MIN: CPT | Performed by: PSYCHIATRY & NEUROLOGY

## 2024-11-20 PROCEDURE — 3008F BODY MASS INDEX DOCD: CPT | Performed by: PSYCHIATRY & NEUROLOGY

## 2024-11-20 RX ORDER — DEXMETHYLPHENIDATE HYDROCHLORIDE 5 MG/1
5 TABLET ORAL 2 TIMES DAILY
Qty: 60 TABLET | Refills: 0 | Status: SHIPPED | OUTPATIENT
Start: 2024-11-20 | End: 2024-12-20

## 2024-11-20 RX ORDER — DEXMETHYLPHENIDATE HYDROCHLORIDE 5 MG/1
5 TABLET ORAL 2 TIMES DAILY
Qty: 60 TABLET | Refills: 0 | Status: SHIPPED | OUTPATIENT
Start: 2024-12-19

## 2024-11-20 RX ORDER — DEXMETHYLPHENIDATE HYDROCHLORIDE 5 MG/1
5 TABLET ORAL 2 TIMES DAILY
Qty: 60 TABLET | Refills: 0 | Status: SHIPPED | OUTPATIENT
Start: 2025-01-18 | End: 2025-02-17

## 2024-11-20 ASSESSMENT — ENCOUNTER SYMPTOMS
BRUISES/BLEEDS EASILY: 0
JOINT SWELLING: 0
COUGH: 0
LIGHT-HEADEDNESS: 0
FATIGUE: 0
NUMBNESS: 0
DIFFICULTY URINATING: 0
CONFUSION: 0
DIZZINESS: 0
NECK STIFFNESS: 0
ARTHRALGIAS: 0
PALPITATIONS: 0
ADENOPATHY: 0
PHOTOPHOBIA: 0
SINUS PRESSURE: 0
EYE PAIN: 0
SEIZURES: 0
FREQUENCY: 0
HEADACHES: 0
UNEXPECTED WEIGHT CHANGE: 0
VOMITING: 0
SPEECH DIFFICULTY: 0
BACK PAIN: 0
HALLUCINATIONS: 0
TREMORS: 0
HYPERACTIVE: 0
FEVER: 0
NAUSEA: 0
ABDOMINAL PAIN: 0
SHORTNESS OF BREATH: 0
AGITATION: 0
WHEEZING: 0
TROUBLE SWALLOWING: 0
NECK PAIN: 0
SLEEP DISTURBANCE: 0
FACIAL ASYMMETRY: 0
WEAKNESS: 0

## 2024-11-20 ASSESSMENT — PATIENT HEALTH QUESTIONNAIRE - PHQ9
SUM OF ALL RESPONSES TO PHQ9 QUESTIONS 1 AND 2: 0
2. FEELING DOWN, DEPRESSED OR HOPELESS: NOT AT ALL
1. LITTLE INTEREST OR PLEASURE IN DOING THINGS: NOT AT ALL

## 2024-11-20 NOTE — PROGRESS NOTES
Sonia Zaidi  39 y.o.       SUBJECTIVE    HPI   Sonia is a 39-year-old young lady who was seen today for follow-up of her encephalopathy due to attention deficit disorder with difficulty at work at home.  Since last seen she has done very well on Focalin 5 mg twice a day and send that she takes only as needed.  She can tell the difference when she is on medicine compared off medicine with the long-acting medicine she was having side effects.  Today her physical and neurological exam is normal.  Would like to continue her medicine the way she is taking and have discussed the controlled substance policy, abuse potential, risk benefit and the precautions to be taken and depending on how she does I might make future recommendation when she comes back to see me in 3 months.    I did review the medication list.  Para  Due to technical limitations of voice recognition and human error, this note may not accurately reflect the care of the patient.    Review of Systems   Constitutional:  Negative for fatigue, fever and unexpected weight change.   HENT:  Negative for dental problem, ear pain, hearing loss, sinus pressure, tinnitus and trouble swallowing.    Eyes:  Negative for photophobia, pain and visual disturbance.   Respiratory:  Negative for cough, shortness of breath and wheezing.    Cardiovascular:  Negative for chest pain, palpitations and leg swelling.   Gastrointestinal:  Negative for abdominal pain, nausea and vomiting.   Genitourinary:  Negative for difficulty urinating, enuresis and frequency.   Musculoskeletal:  Negative for arthralgias, back pain, joint swelling, neck pain and neck stiffness.   Skin:  Negative for pallor and rash.   Allergic/Immunologic: Negative for food allergies.   Neurological:  Negative for dizziness, tremors, seizures, syncope, facial asymmetry, speech difficulty, weakness, light-headedness, numbness and headaches.   Hematological:  Negative for adenopathy. Does not bruise/bleed easily.  "  Psychiatric/Behavioral:  Negative for agitation, behavioral problems, confusion, hallucinations and sleep disturbance. The patient is not hyperactive.         Patient Active Problem List   Diagnosis    PMS (premenstrual syndrome)    IBS (irritable bowel syndrome)    Moderate asthma without complication ([facility])    Abnormal vaginal bleeding    ADHD    Anxiety    Chronic bilateral low back pain with right-sided sciatica    Enlarged lymph nodes    Gastroesophageal reflux disease without esophagitis    Generalized abdominal pain    History of ADHD    History of  section    History of shingles    Myopia with astigmatism    Pain in joint, lower leg    SVT (supraventricular tachycardia) (CMS-HCC)    Tear film insufficiency    Asthma     Past Medical History:   Diagnosis Date    Anxiety     Asthma     IBS (irritable bowel syndrome)      Past Surgical History:   Procedure Laterality Date     SECTION, CLASSIC      UMBILICAL HERNIA REPAIR         reports that she has never smoked. She has never used smokeless tobacco. She reports current alcohol use. She reports that she does not currently use drugs.    /60 (BP Location: Left arm, Patient Position: Sitting, BP Cuff Size: Adult)   Pulse 95   Ht 1.626 m (5' 4\")   Wt 59.8 kg (131 lb 12.8 oz)   LMP 2024 (Exact Date)   BMI 22.62 kg/m²     OBJECTIVE  Physical Exam/Neurological Exam   Constitutional: General appearance: no acute distress   Auscultation of Heart: Regular rate and rhythm, no murmurs, normal S1 and S2.   Carotid Arteries: Intact without any bruits.   Neck is supple.   No lymph adenopathy.   Peripheral Vascular Exam: Pulses +2 and equal in all extremities. No swelling, varicosities, edema or tenderness to palpations.    Abdomen is soft, nondistended. No organomegaly.  Mental status: The patient was in no distress, alert, interactive and cooperative. Affect is appropriate.   Orientation: oriented to person, oriented to place and " oriented to time.   Memory: recent memory intact and remote memory intact.   Attention: normal attention span and normal concentrating ability.   Language: normal comprehension and no difficulty naming common objects.   Fund of knowledge: Patient displays adequate knowledge of current events, adequate fund of knowledge regarding past history and adequate fund of knowledge regarding vocabulary.   Eyes: The ophthalmoscopic examination was normal. The fundi are visualized with normal disc margins and without.  Cranial nerve II: Visual fields full to confrontation.   Cranial nerves III, IV, and VI: Pupils round, equally reactive to light; no ptosis. EOMs intact. No nystagmus.   Cranial Nerve V: Facial sensation intact bilaterally.   Cranial nerve VII: Normal and symmetric facial strength.   Cranial nerve VIII: Hearing is intact bilaterally to finger rub / whisper.   Cranial nerves IX and X: Palate elevates symmetrically.   Cranial nerve XI: Shoulder shrug and neck rotation strength are intact.   Cranial nerve XII: Tongue midline with normal strength.   Motor: Motor exam was normal. Muscle bulk was normal in both upper and lower extremities. Muscle tone was normal in both upper and lower extremities. Muscle strength was 5/5 throughout. no abnormal or adventitious movements were present.   Deep Tendon Reflexes: left biceps 2+ , right biceps 2+, left triceps 2+, right triceps 2+, left brachioradialis 2+, right brachioradialis 2+, left patella 2+, right patella 2+, left ankle jerk 2+, right ankle jerk 2+   Plantar Reflex: Toes downgoing to plantar stimulation on the left. Toes downgoing to plantar stimulation on the right.   Sensory Exam: Normal to light touch.   Coordination: There is no limb dystaxia and rapid alternating movements are intact.  Gait: Gait is normal without spasticity, ataxia or bradykinesia. Stance is stable with a negative Romberg.      ASSESSMENT/PLAN  Diagnoses and all orders for this  visit:  Encephalopathy, unspecified type  Attention deficit hyperactivity disorder (ADHD), combined type  -     dexmethylphenidate (Focalin) 5 mg tablet; Take 1 tablet (5 mg) by mouth 2 times a day. Do not fill before December 19, 2024.  -     dexmethylphenidate (Focalin) 5 mg tablet; Take 1 tablet (5 mg) by mouth 2 times a day.  -     dexmethylphenidate (Focalin) 5 mg tablet; Take 1 tablet (5 mg) by mouth 2 times a day. Do not fill before January 18, 2025.  Attention deficit hyperactivity disorder (ADHD), predominantly inattentive type        Nikolas Pereira MD  11/20/2024  4:27 PM

## 2025-03-05 ENCOUNTER — APPOINTMENT (OUTPATIENT)
Dept: NEUROLOGY | Facility: CLINIC | Age: 40
End: 2025-03-05
Payer: COMMERCIAL

## 2025-07-10 ENCOUNTER — APPOINTMENT (OUTPATIENT)
Dept: OBSTETRICS AND GYNECOLOGY | Facility: CLINIC | Age: 40
End: 2025-07-10
Payer: COMMERCIAL

## 2025-07-10 VITALS
WEIGHT: 129.4 LBS | DIASTOLIC BLOOD PRESSURE: 62 MMHG | HEIGHT: 64 IN | SYSTOLIC BLOOD PRESSURE: 96 MMHG | BODY MASS INDEX: 22.09 KG/M2

## 2025-07-10 DIAGNOSIS — N93.9 ABNORMAL UTERINE BLEEDING (AUB): Primary | ICD-10-CM

## 2025-07-10 PROCEDURE — 99213 OFFICE O/P EST LOW 20 MIN: CPT | Performed by: ADVANCED PRACTICE MIDWIFE

## 2025-07-10 PROCEDURE — 1036F TOBACCO NON-USER: CPT | Performed by: ADVANCED PRACTICE MIDWIFE

## 2025-07-10 PROCEDURE — 3008F BODY MASS INDEX DOCD: CPT | Performed by: ADVANCED PRACTICE MIDWIFE

## 2025-07-10 RX ORDER — CLINDAMYCIN PHOSPHATE 10 UG/ML
LOTION TOPICAL
COMMUNITY
Start: 2024-11-04

## 2025-07-10 NOTE — PROGRESS NOTES
"Assessment/Plan   Sonia was seen today for follow-up.  Diagnoses and all orders for this visit:  Abnormal uterine bleeding (AUB)  -     US PELVIS TRANSABDOMINAL WITH TRANSVAGINAL; Future    Menorrhagia- clotty heavy periods normal cycle and length, no pain. ~1yr  -CBC Hgb 13, CMP WNL  -Discussed potential etiologies for menorrhagia  -Pt has trialed and did not feel well on [facility] pill in the past, is hesitant to -Progesterone method as just regulating bowel now  -Discussed if dizziness during periods or anemia will need to reconsider hormonal regulation of bleeding    Mild fullness left sided pelvis, last ultrasound , recommended repeat imaging as new onset AUB     Follow up per results  RTC annual exam due in 3 months    Subjective   Sonia Zaidi is a 40 y.o. female who presents for follow up for breast pain and blood clots.  Monthly periods with Clotty Heavy bleeding 2-3 days, changing super tampon more than once an hour, no pain  Heavy periods x almost 1 year,  CBC recent WNL    Partner Vasectomy  Monogamous marriage    Breast pain cyclic, worse in left breast, mammogram WNL    Acupuncture helping with PMS  Supplements: vanessa, magnesium citrate    Note treatment for bowel methane overproduction, has resolved constipation    Menstrual History:  OB History          2    Para   2    Term   2            AB        Living             SAB        IAB        Ectopic        Multiple        Live Births                    Patient's last menstrual period was 2025 (exact date).     ROS as in HPI    Objective   BP 96/62 (BP Location: Left arm, Patient Position: Sitting)   Ht 1.626 m (5' 4\")   Wt 58.7 kg (129 lb 6.4 oz)   LMP 2025 (Exact Date)   BMI 22.21 kg/m²     Physical Exam  Pulmonary:      Effort: Pulmonary effort is normal.   Abdominal:      Tenderness: There is no abdominal tenderness.   Genitourinary:     General: Normal vulva.      Vagina: Normal.      Cervix: Normal.      Uterus: Normal. "       Adnexa: Right adnexa normal.        Left: Fullness present.    Neurological:      Mental Status: She is alert.

## 2025-07-17 ENCOUNTER — HOSPITAL ENCOUNTER (OUTPATIENT)
Dept: RADIOLOGY | Facility: CLINIC | Age: 40
Discharge: HOME | End: 2025-07-17
Payer: COMMERCIAL

## 2025-07-17 DIAGNOSIS — N93.9 ABNORMAL UTERINE BLEEDING (AUB): ICD-10-CM

## 2025-07-17 PROCEDURE — 76830 TRANSVAGINAL US NON-OB: CPT | Performed by: STUDENT IN AN ORGANIZED HEALTH CARE EDUCATION/TRAINING PROGRAM

## 2025-07-17 PROCEDURE — 76856 US EXAM PELVIC COMPLETE: CPT | Performed by: STUDENT IN AN ORGANIZED HEALTH CARE EDUCATION/TRAINING PROGRAM

## 2025-07-17 PROCEDURE — 76830 TRANSVAGINAL US NON-OB: CPT

## 2025-10-16 ENCOUNTER — APPOINTMENT (OUTPATIENT)
Dept: OBSTETRICS AND GYNECOLOGY | Facility: CLINIC | Age: 40
End: 2025-10-16
Payer: COMMERCIAL

## 2025-10-23 ENCOUNTER — APPOINTMENT (OUTPATIENT)
Dept: OBSTETRICS AND GYNECOLOGY | Facility: CLINIC | Age: 40
End: 2025-10-23
Payer: COMMERCIAL